# Patient Record
Sex: FEMALE | Race: BLACK OR AFRICAN AMERICAN | NOT HISPANIC OR LATINO | Employment: UNEMPLOYED | URBAN - METROPOLITAN AREA
[De-identification: names, ages, dates, MRNs, and addresses within clinical notes are randomized per-mention and may not be internally consistent; named-entity substitution may affect disease eponyms.]

---
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
## 2024-01-02 ENCOUNTER — ROUTINE PRENATAL (OUTPATIENT)
Dept: OBSTETRICS AND GYNECOLOGY | Facility: CLINIC | Age: 19
End: 2024-01-02
Payer: COMMERCIAL

## 2024-01-02 VITALS — WEIGHT: 210 LBS | BODY MASS INDEX: 39.68 KG/M2 | DIASTOLIC BLOOD PRESSURE: 60 MMHG | SYSTOLIC BLOOD PRESSURE: 112 MMHG

## 2024-01-02 DIAGNOSIS — R73.09 ELEVATED GLUCOSE: ICD-10-CM

## 2024-01-02 DIAGNOSIS — Z34.03 ENCOUNTER FOR PRENATAL CARE IN THIRD TRIMESTER OF FIRST PREGNANCY (HHS-HCC): Primary | ICD-10-CM

## 2024-01-02 DIAGNOSIS — Z3A.35 35 WEEKS GESTATION OF PREGNANCY (HHS-HCC): ICD-10-CM

## 2024-01-02 DIAGNOSIS — O40.3XX1 POLYHYDRAMNIOS IN THIRD TRIMESTER COMPLICATION, FETUS 1 OF MULTIPLE GESTATION (HHS-HCC): ICD-10-CM

## 2024-01-02 PROCEDURE — 0501F PRENATAL FLOW SHEET: CPT | Performed by: ADVANCED PRACTICE MIDWIFE

## 2024-01-02 PROCEDURE — 59025 FETAL NON-STRESS TEST: CPT | Performed by: ADVANCED PRACTICE MIDWIFE

## 2024-01-03 ENCOUNTER — LAB (OUTPATIENT)
Dept: LAB | Facility: LAB | Age: 19
End: 2024-01-03
Payer: COMMERCIAL

## 2024-01-03 DIAGNOSIS — R73.09 ELEVATED GLUCOSE: ICD-10-CM

## 2024-01-03 PROCEDURE — 85027 COMPLETE CBC AUTOMATED: CPT

## 2024-01-03 PROCEDURE — 82728 ASSAY OF FERRITIN: CPT

## 2024-01-03 PROCEDURE — 82746 ASSAY OF FOLIC ACID SERUM: CPT

## 2024-01-03 PROCEDURE — 82950 GLUCOSE TEST: CPT

## 2024-01-03 PROCEDURE — 82607 VITAMIN B-12: CPT

## 2024-01-03 PROCEDURE — 36415 COLL VENOUS BLD VENIPUNCTURE: CPT

## 2024-01-03 PROCEDURE — 82952 GTT-ADDED SAMPLES: CPT

## 2024-01-03 PROCEDURE — 82947 ASSAY GLUCOSE BLOOD QUANT: CPT

## 2024-01-03 PROCEDURE — 83550 IRON BINDING TEST: CPT

## 2024-01-03 PROCEDURE — 82951 GLUCOSE TOLERANCE TEST (GTT): CPT

## 2024-01-04 LAB
ERYTHROCYTE [DISTWIDTH] IN BLOOD BY AUTOMATED COUNT: 17.1 % (ref 11.5–14.5)
FERRITIN SERPL-MCNC: 26 NG/ML
FOLATE SERPL-MCNC: 9.9 NG/ML
GLUCOSE 1H P 100 G GLC PO SERPL-MCNC: 121 MG/DL
GLUCOSE 2H P 100 G GLC PO SERPL-MCNC: 121 MG/DL
GLUCOSE 3H P 100 G GLC PO SERPL-MCNC: 98 MG/DL
GLUCOSE P FAST SERPL-MCNC: 79 MG/DL
HCT VFR BLD AUTO: 30.8 % (ref 36–46)
HGB BLD-MCNC: 8.9 G/DL (ref 12–16)
IRON SATN MFR SERPL: NORMAL %
IRON SERPL-MCNC: 27 UG/DL
MCH RBC QN AUTO: 23.9 PG (ref 26–34)
MCHC RBC AUTO-ENTMCNC: 28.9 G/DL (ref 32–36)
MCV RBC AUTO: 83 FL (ref 80–100)
NRBC BLD-RTO: 0.6 /100 WBCS (ref 0–0)
PLATELET # BLD AUTO: 233 X10*3/UL (ref 150–450)
RBC # BLD AUTO: 3.72 X10*6/UL (ref 4–5.2)
REFLEX ADDED, ANEMIA PANEL: NORMAL
TIBC SERPL-MCNC: NORMAL UG/DL
UIBC SERPL-MCNC: >450 UG/DL
VIT B12 SERPL-MCNC: 224 PG/ML
WBC # BLD AUTO: 10.7 X10*3/UL (ref 4.4–11.3)

## 2024-01-09 ENCOUNTER — ROUTINE PRENATAL (OUTPATIENT)
Dept: OBSTETRICS AND GYNECOLOGY | Facility: CLINIC | Age: 19
End: 2024-01-09
Payer: COMMERCIAL

## 2024-01-09 ENCOUNTER — ANCILLARY PROCEDURE (OUTPATIENT)
Dept: RADIOLOGY | Facility: CLINIC | Age: 19
End: 2024-01-09
Payer: COMMERCIAL

## 2024-01-09 VITALS — SYSTOLIC BLOOD PRESSURE: 114 MMHG | WEIGHT: 212 LBS | DIASTOLIC BLOOD PRESSURE: 60 MMHG | BODY MASS INDEX: 40.06 KG/M2

## 2024-01-09 DIAGNOSIS — O99.013 ANEMIA AFFECTING PREGNANCY IN THIRD TRIMESTER (HHS-HCC): ICD-10-CM

## 2024-01-09 DIAGNOSIS — O36.8131 DECREASED FETAL MOVEMENTS IN THIRD TRIMESTER, FETUS 1 OF MULTIPLE GESTATION (HHS-HCC): Primary | ICD-10-CM

## 2024-01-09 DIAGNOSIS — Z34.03 ENCOUNTER FOR PRENATAL CARE IN THIRD TRIMESTER OF FIRST PREGNANCY (HHS-HCC): ICD-10-CM

## 2024-01-09 DIAGNOSIS — L29.9 ITCHING: ICD-10-CM

## 2024-01-09 DIAGNOSIS — R73.09 ELEVATED GLUCOSE: ICD-10-CM

## 2024-01-09 DIAGNOSIS — O99.891 BACK PAIN IN PREGNANCY (HHS-HCC): ICD-10-CM

## 2024-01-09 DIAGNOSIS — M54.9 BACK PAIN IN PREGNANCY (HHS-HCC): ICD-10-CM

## 2024-01-09 DIAGNOSIS — O09.899 HIGH RISK TEEN PREGNANCY, ANTEPARTUM (HHS-HCC): ICD-10-CM

## 2024-01-09 DIAGNOSIS — O26.849 FETAL SIZE INCONSISTENT WITH DATES (HHS-HCC): ICD-10-CM

## 2024-01-09 PROCEDURE — 87081 CULTURE SCREEN ONLY: CPT

## 2024-01-09 PROCEDURE — 0501F PRENATAL FLOW SHEET: CPT | Performed by: OBSTETRICS & GYNECOLOGY

## 2024-01-09 PROCEDURE — 76816 OB US FOLLOW-UP PER FETUS: CPT | Performed by: OBSTETRICS & GYNECOLOGY

## 2024-01-09 PROCEDURE — 76816 OB US FOLLOW-UP PER FETUS: CPT

## 2024-01-09 PROCEDURE — 59025 FETAL NON-STRESS TEST: CPT | Performed by: OBSTETRICS & GYNECOLOGY

## 2024-01-09 PROCEDURE — 76819 FETAL BIOPHYS PROFIL W/O NST: CPT | Performed by: OBSTETRICS & GYNECOLOGY

## 2024-01-09 NOTE — PROGRESS NOTES
"Patient initially reported decreased fetal movement.  She had an 8/8 BPP with normal JAGDEEP today/this morning.  She has been on the NST and now reports good fetal movement.  She is without symptoms of PTL or PEC.  Although she does report some \"itchy feet \".    She missed her iron infusion appointment last Friday she plans to go this Friday.    NST - reactive/category 1    A/P: HROB - Teen, anemia, obesity, decreased FM, elevated 1 hour GCT/normal GTT    -  NST     -  FM d/w the patient     -  LFTs and bile acids    -  GBS cx sent     -  RTC Q week     -  Iron infusion scheduled     -  Weekly NSTs for BMI>40  "

## 2024-01-12 ENCOUNTER — TELEPHONE (OUTPATIENT)
Dept: OBSTETRICS AND GYNECOLOGY | Facility: CLINIC | Age: 19
End: 2024-01-12
Payer: COMMERCIAL

## 2024-01-12 LAB — GP B STREP GENITAL QL CULT: NORMAL

## 2024-01-12 NOTE — TELEPHONE ENCOUNTER
Name/ verified  Pt called stating that when she woke up this morning she wasn't sure if her water broke. Denies current gush or constant trickle of fluid. Denies pain. +FM noted. Pt states she wasn't sure if it was urine. Reviewed sign of labor and encouraged to use panty liner and continue to monitor fluid. If she has to change pad and or any pelvic pain increases in duration and frequency-advised pt to L&D for rule out ROM.  Pt verbalized understanding.

## 2024-01-12 NOTE — TELEPHONE ENCOUNTER
Name/ verified  Called pt to follow up on recent My Chart message.  Pt is 36.5wks. When pt woke up this morning to use the bathroom and get ready she noticed a lot of discharge so she put a pad/panty liner on. After like 5 mins she noticed it started to feel like she was peeing on herself a little. She said then it stopped and then noticed a little trickle again. On the panty liner it looked yellow, and she wasn't sure if it was urine or not.  At this time she does not notice any leakage of fluid. Reviewed to go to L&D if she continues to notice leakage of fluid on michael pad or continuous trickle or gush of fluid. Along with any abdominal cramping or pain.  Pt verbalized understanding.

## 2024-01-16 ENCOUNTER — ROUTINE PRENATAL (OUTPATIENT)
Dept: OBSTETRICS AND GYNECOLOGY | Facility: CLINIC | Age: 19
End: 2024-01-16
Payer: COMMERCIAL

## 2024-01-16 ENCOUNTER — PREP FOR PROCEDURE (OUTPATIENT)
Dept: OBSTETRICS AND GYNECOLOGY | Facility: CLINIC | Age: 19
End: 2024-01-16

## 2024-01-16 ENCOUNTER — APPOINTMENT (OUTPATIENT)
Dept: PRIMARY CARE | Facility: CLINIC | Age: 19
End: 2024-01-16
Payer: COMMERCIAL

## 2024-01-16 VITALS — DIASTOLIC BLOOD PRESSURE: 70 MMHG | SYSTOLIC BLOOD PRESSURE: 110 MMHG | BODY MASS INDEX: 40.25 KG/M2 | WEIGHT: 213 LBS

## 2024-01-16 DIAGNOSIS — O99.213 OBESITY AFFECTING PREGNANCY IN THIRD TRIMESTER, UNSPECIFIED OBESITY TYPE (HHS-HCC): ICD-10-CM

## 2024-01-16 DIAGNOSIS — O99.891 BACK PAIN IN PREGNANCY (HHS-HCC): ICD-10-CM

## 2024-01-16 DIAGNOSIS — O99.013 ANEMIA AFFECTING PREGNANCY IN THIRD TRIMESTER (HHS-HCC): ICD-10-CM

## 2024-01-16 DIAGNOSIS — M54.9 BACK PAIN IN PREGNANCY (HHS-HCC): ICD-10-CM

## 2024-01-16 DIAGNOSIS — O09.899 HIGH RISK TEEN PREGNANCY, ANTEPARTUM (HHS-HCC): Primary | ICD-10-CM

## 2024-01-16 PROCEDURE — 0501F PRENATAL FLOW SHEET: CPT | Performed by: OBSTETRICS & GYNECOLOGY

## 2024-01-16 NOTE — PROGRESS NOTES
The patient reports good fetal movement.  She is without symptoms of active labor or preeclampsia, although she would like her cervix checked today.    She states the itching on her feet has dramatically decreased.    O: -/-3/soft/posterior       GBS negative        AC=92%ile    A/P: HROB - Teen, morbid obesity, anemia     -  D/W the patient 39 week IOL     -  Fetal movement     -  Pt still needs to identify a Pediatrician     -  Iron infusions

## 2024-01-16 NOTE — PROGRESS NOTES
Dr. Man request pt to have IOL at 39wks gest. Sarthak Robins -pt to have IOL on January 30 at 11am. IOL instructions provided to pt and verbally discussed. Pt verbalized understanding. Pt placed on EFM for scheduled NST.

## 2024-01-19 ENCOUNTER — APPOINTMENT (OUTPATIENT)
Dept: RHEUMATOLOGY | Facility: CLINIC | Age: 19
End: 2024-01-19
Payer: COMMERCIAL

## 2024-01-22 ENCOUNTER — ANESTHESIA EVENT (OUTPATIENT)
Dept: OBSTETRICS AND GYNECOLOGY | Facility: HOSPITAL | Age: 19
End: 2024-01-22
Payer: COMMERCIAL

## 2024-01-22 ENCOUNTER — APPOINTMENT (OUTPATIENT)
Dept: OBSTETRICS AND GYNECOLOGY | Facility: CLINIC | Age: 19
End: 2024-01-22
Payer: COMMERCIAL

## 2024-01-22 ENCOUNTER — ANESTHESIA (OUTPATIENT)
Dept: OBSTETRICS AND GYNECOLOGY | Facility: HOSPITAL | Age: 19
End: 2024-01-22
Payer: COMMERCIAL

## 2024-01-22 ENCOUNTER — HOSPITAL ENCOUNTER (INPATIENT)
Facility: HOSPITAL | Age: 19
LOS: 2 days | Discharge: HOME | End: 2024-01-24
Attending: OBSTETRICS & GYNECOLOGY | Admitting: OBSTETRICS & GYNECOLOGY
Payer: COMMERCIAL

## 2024-01-22 DIAGNOSIS — E78.49 OTHER HYPERLIPIDEMIA: ICD-10-CM

## 2024-01-22 LAB
ABO GROUP (TYPE) IN BLOOD: NORMAL
ANTIBODY SCREEN: NORMAL
BB ANTIBODY IDENTIFICATION: NORMAL
CASE #: NORMAL
ERYTHROCYTE [DISTWIDTH] IN BLOOD BY AUTOMATED COUNT: 17.2 % (ref 11.5–14.5)
HCT VFR BLD AUTO: 32.8 % (ref 36–46)
HGB BLD-MCNC: 10.2 G/DL (ref 12–16)
MCH RBC QN AUTO: 24.2 PG (ref 26–34)
MCHC RBC AUTO-ENTMCNC: 31.1 G/DL (ref 32–36)
MCV RBC AUTO: 78 FL (ref 80–100)
NRBC BLD-RTO: 0.3 /100 WBCS (ref 0–0)
PLATELET # BLD AUTO: 206 X10*3/UL (ref 150–450)
RBC # BLD AUTO: 4.22 X10*6/UL (ref 4–5.2)
RH FACTOR (ANTIGEN D): NORMAL
TREPONEMA PALLIDUM IGG+IGM AB [PRESENCE] IN SERUM OR PLASMA BY IMMUNOASSAY: NONREACTIVE
WBC # BLD AUTO: 13 X10*3/UL (ref 4.4–11.3)

## 2024-01-22 PROCEDURE — 59409 OBSTETRICAL CARE: CPT | Performed by: OBSTETRICS & GYNECOLOGY

## 2024-01-22 PROCEDURE — 51701 INSERT BLADDER CATHETER: CPT

## 2024-01-22 PROCEDURE — 01967 NEURAXL LBR ANES VAG DLVR: CPT | Performed by: ANESTHESIOLOGY

## 2024-01-22 PROCEDURE — 86780 TREPONEMA PALLIDUM: CPT

## 2024-01-22 PROCEDURE — 2500000005 HC RX 250 GENERAL PHARMACY W/O HCPCS

## 2024-01-22 PROCEDURE — 1100000001 HC PRIVATE ROOM DAILY

## 2024-01-22 PROCEDURE — 2500000004 HC RX 250 GENERAL PHARMACY W/ HCPCS (ALT 636 FOR OP/ED)

## 2024-01-22 PROCEDURE — 86922 COMPATIBILITY TEST ANTIGLOB: CPT

## 2024-01-22 PROCEDURE — 0UQMXZZ REPAIR VULVA, EXTERNAL APPROACH: ICD-10-PCS | Performed by: OBSTETRICS & GYNECOLOGY

## 2024-01-22 PROCEDURE — 86077 PHYS BLOOD BANK SERV XMATCH: CPT

## 2024-01-22 PROCEDURE — 85027 COMPLETE CBC AUTOMATED: CPT

## 2024-01-22 PROCEDURE — 86850 RBC ANTIBODY SCREEN: CPT

## 2024-01-22 PROCEDURE — 36415 COLL VENOUS BLD VENIPUNCTURE: CPT

## 2024-01-22 PROCEDURE — 86901 BLOOD TYPING SEROLOGIC RH(D): CPT

## 2024-01-22 PROCEDURE — 86870 RBC ANTIBODY IDENTIFICATION: CPT

## 2024-01-22 PROCEDURE — 59400 OBSTETRICAL CARE: CPT

## 2024-01-22 PROCEDURE — 10907ZC DRAINAGE OF AMNIOTIC FLUID, THERAPEUTIC FROM PRODUCTS OF CONCEPTION, VIA NATURAL OR ARTIFICIAL OPENING: ICD-10-PCS | Performed by: OBSTETRICS & GYNECOLOGY

## 2024-01-22 PROCEDURE — 2500000001 HC RX 250 WO HCPCS SELF ADMINISTERED DRUGS (ALT 637 FOR MEDICARE OP)

## 2024-01-22 RX ORDER — METOCLOPRAMIDE 10 MG/1
10 TABLET ORAL EVERY 6 HOURS PRN
Status: DISCONTINUED | OUTPATIENT
Start: 2024-01-22 | End: 2024-01-23

## 2024-01-22 RX ORDER — LABETALOL HYDROCHLORIDE 5 MG/ML
20 INJECTION, SOLUTION INTRAVENOUS ONCE AS NEEDED
Status: DISCONTINUED | OUTPATIENT
Start: 2024-01-22 | End: 2024-01-23

## 2024-01-22 RX ORDER — IBUPROFEN 600 MG/1
600 TABLET ORAL EVERY 6 HOURS
Status: DISCONTINUED | OUTPATIENT
Start: 2024-01-22 | End: 2024-01-24 | Stop reason: HOSPADM

## 2024-01-22 RX ORDER — CARBOPROST TROMETHAMINE 250 UG/ML
250 INJECTION, SOLUTION INTRAMUSCULAR ONCE AS NEEDED
Status: DISCONTINUED | OUTPATIENT
Start: 2024-01-22 | End: 2024-01-23

## 2024-01-22 RX ORDER — HYDRALAZINE HYDROCHLORIDE 20 MG/ML
5 INJECTION INTRAMUSCULAR; INTRAVENOUS ONCE AS NEEDED
Status: DISCONTINUED | OUTPATIENT
Start: 2024-01-22 | End: 2024-01-23

## 2024-01-22 RX ORDER — LIDOCAINE HYDROCHLORIDE 10 MG/ML
30 INJECTION INFILTRATION; PERINEURAL ONCE AS NEEDED
Status: DISCONTINUED | OUTPATIENT
Start: 2024-01-22 | End: 2024-01-23

## 2024-01-22 RX ORDER — SODIUM CHLORIDE, SODIUM LACTATE, POTASSIUM CHLORIDE, CALCIUM CHLORIDE 600; 310; 30; 20 MG/100ML; MG/100ML; MG/100ML; MG/100ML
125 INJECTION, SOLUTION INTRAVENOUS CONTINUOUS
Status: DISCONTINUED | OUTPATIENT
Start: 2024-01-22 | End: 2024-01-23

## 2024-01-22 RX ORDER — TRANEXAMIC ACID 100 MG/ML
1000 INJECTION, SOLUTION INTRAVENOUS ONCE AS NEEDED
Status: DISCONTINUED | OUTPATIENT
Start: 2024-01-22 | End: 2024-01-23

## 2024-01-22 RX ORDER — NIFEDIPINE 10 MG/1
10 CAPSULE ORAL ONCE AS NEEDED
Status: DISCONTINUED | OUTPATIENT
Start: 2024-01-22 | End: 2024-01-23

## 2024-01-22 RX ORDER — MISOPROSTOL 200 UG/1
800 TABLET ORAL ONCE AS NEEDED
Status: DISCONTINUED | OUTPATIENT
Start: 2024-01-22 | End: 2024-01-23

## 2024-01-22 RX ORDER — TERBUTALINE SULFATE 1 MG/ML
0.25 INJECTION SUBCUTANEOUS ONCE AS NEEDED
Status: DISCONTINUED | OUTPATIENT
Start: 2024-01-22 | End: 2024-01-23

## 2024-01-22 RX ORDER — ONDANSETRON HYDROCHLORIDE 2 MG/ML
4 INJECTION, SOLUTION INTRAVENOUS EVERY 6 HOURS PRN
Status: DISCONTINUED | OUTPATIENT
Start: 2024-01-22 | End: 2024-01-23

## 2024-01-22 RX ORDER — METOCLOPRAMIDE HYDROCHLORIDE 5 MG/ML
10 INJECTION INTRAMUSCULAR; INTRAVENOUS EVERY 6 HOURS PRN
Status: DISCONTINUED | OUTPATIENT
Start: 2024-01-22 | End: 2024-01-23

## 2024-01-22 RX ORDER — ONDANSETRON 4 MG/1
4 TABLET, FILM COATED ORAL EVERY 6 HOURS PRN
Status: DISCONTINUED | OUTPATIENT
Start: 2024-01-22 | End: 2024-01-23

## 2024-01-22 RX ORDER — OXYTOCIN 10 [USP'U]/ML
10 INJECTION, SOLUTION INTRAMUSCULAR; INTRAVENOUS ONCE AS NEEDED
Status: DISCONTINUED | OUTPATIENT
Start: 2024-01-22 | End: 2024-01-23

## 2024-01-22 RX ORDER — LOPERAMIDE HYDROCHLORIDE 2 MG/1
4 CAPSULE ORAL EVERY 2 HOUR PRN
Status: DISCONTINUED | OUTPATIENT
Start: 2024-01-22 | End: 2024-01-23

## 2024-01-22 RX ORDER — ACETAMINOPHEN 325 MG/1
975 TABLET ORAL EVERY 6 HOURS
Status: DISCONTINUED | OUTPATIENT
Start: 2024-01-22 | End: 2024-01-24 | Stop reason: HOSPADM

## 2024-01-22 RX ORDER — METHYLERGONOVINE MALEATE 0.2 MG/ML
0.2 INJECTION INTRAVENOUS ONCE AS NEEDED
Status: DISCONTINUED | OUTPATIENT
Start: 2024-01-22 | End: 2024-01-23

## 2024-01-22 RX ORDER — FENTANYL/BUPIVACAINE/NS/PF 2MCG/ML-.1
PLASTIC BAG, INJECTION (ML) INJECTION CONTINUOUS PRN
Status: DISCONTINUED | OUTPATIENT
Start: 2024-01-22 | End: 2024-01-22

## 2024-01-22 RX ORDER — FENTANYL/BUPIVACAINE/NS/PF 2MCG/ML-.1
PLASTIC BAG, INJECTION (ML) INJECTION AS NEEDED
Status: DISCONTINUED | OUTPATIENT
Start: 2024-01-22 | End: 2024-01-22

## 2024-01-22 RX ORDER — OXYTOCIN/0.9 % SODIUM CHLORIDE 30/500 ML
60 PLASTIC BAG, INJECTION (ML) INTRAVENOUS ONCE AS NEEDED
Status: COMPLETED | OUTPATIENT
Start: 2024-01-22 | End: 2024-01-22

## 2024-01-22 RX ADMIN — Medication 5 ML: at 09:45

## 2024-01-22 RX ADMIN — IBUPROFEN 600 MG: 600 TABLET, FILM COATED ORAL at 23:48

## 2024-01-22 RX ADMIN — SODIUM CHLORIDE, POTASSIUM CHLORIDE, SODIUM LACTATE AND CALCIUM CHLORIDE 125 ML/HR: 600; 310; 30; 20 INJECTION, SOLUTION INTRAVENOUS at 08:15

## 2024-01-22 RX ADMIN — ACETAMINOPHEN 975 MG: 325 TABLET ORAL at 23:49

## 2024-01-22 RX ADMIN — Medication 14 ML/HR: at 19:47

## 2024-01-22 RX ADMIN — Medication 14 ML/HR: at 09:50

## 2024-01-22 RX ADMIN — Medication 5 ML: at 09:41

## 2024-01-22 RX ADMIN — Medication 60 MILLI-UNITS/MIN: at 21:49

## 2024-01-22 RX ADMIN — SODIUM CHLORIDE, POTASSIUM CHLORIDE, SODIUM LACTATE AND CALCIUM CHLORIDE 500 ML: 600; 310; 30; 20 INJECTION, SOLUTION INTRAVENOUS at 21:06

## 2024-01-22 SDOH — SOCIAL STABILITY: SOCIAL INSECURITY: PHYSICAL ABUSE: DENIES

## 2024-01-22 SDOH — HEALTH STABILITY: MENTAL HEALTH: CURRENT SMOKER: 0

## 2024-01-22 SDOH — SOCIAL STABILITY: SOCIAL INSECURITY: ARE THERE ANY APPARENT SIGNS OF INJURIES/BEHAVIORS THAT COULD BE RELATED TO ABUSE/NEGLECT?: NO

## 2024-01-22 SDOH — SOCIAL STABILITY: SOCIAL INSECURITY: ARE YOU OR HAVE YOU BEEN THREATENED OR ABUSED PHYSICALLY, EMOTIONALLY, OR SEXUALLY BY ANYONE?: NO

## 2024-01-22 SDOH — SOCIAL STABILITY: SOCIAL INSECURITY: ABUSE SCREEN: ADULT

## 2024-01-22 SDOH — HEALTH STABILITY: MENTAL HEALTH: WERE YOU ABLE TO COMPLETE ALL THE BEHAVIORAL HEALTH SCREENINGS?: YES

## 2024-01-22 SDOH — SOCIAL STABILITY: SOCIAL INSECURITY: DO YOU FEEL ANYONE HAS EXPLOITED OR TAKEN ADVANTAGE OF YOU FINANCIALLY OR OF YOUR PERSONAL PROPERTY?: NO

## 2024-01-22 SDOH — ECONOMIC STABILITY: HOUSING INSECURITY: DO YOU FEEL UNSAFE GOING BACK TO THE PLACE WHERE YOU ARE LIVING?: NO

## 2024-01-22 SDOH — HEALTH STABILITY: MENTAL HEALTH: SUICIDAL BEHAVIOR (LIFETIME): NO

## 2024-01-22 SDOH — SOCIAL STABILITY: SOCIAL INSECURITY: DOES ANYONE TRY TO KEEP YOU FROM HAVING/CONTACTING OTHER FRIENDS OR DOING THINGS OUTSIDE YOUR HOME?: NO

## 2024-01-22 SDOH — SOCIAL STABILITY: SOCIAL INSECURITY: HAS ANYONE EVER THREATENED TO HURT YOUR FAMILY OR YOUR PETS?: NO

## 2024-01-22 SDOH — SOCIAL STABILITY: SOCIAL INSECURITY: VERBAL ABUSE: DENIES

## 2024-01-22 SDOH — SOCIAL STABILITY: SOCIAL INSECURITY: HAVE YOU HAD THOUGHTS OF HARMING ANYONE ELSE?: NO

## 2024-01-22 SDOH — HEALTH STABILITY: MENTAL HEALTH: WISH TO BE DEAD (PAST 1 MONTH): NO

## 2024-01-22 SDOH — HEALTH STABILITY: MENTAL HEALTH: NON-SPECIFIC ACTIVE SUICIDAL THOUGHTS (PAST 1 MONTH): NO

## 2024-01-22 ASSESSMENT — PAIN SCALES - GENERAL
PAINLEVEL_OUTOF10: 0 - NO PAIN
PAINLEVEL_OUTOF10: 4
PAINLEVEL_OUTOF10: 0 - NO PAIN
PAINLEVEL_OUTOF10: 4
PAINLEVEL_OUTOF10: 0 - NO PAIN

## 2024-01-22 ASSESSMENT — LIFESTYLE VARIABLES
HOW MANY STANDARD DRINKS CONTAINING ALCOHOL DO YOU HAVE ON A TYPICAL DAY: PATIENT DOES NOT DRINK
AUDIT-C TOTAL SCORE: 0
HOW OFTEN DO YOU HAVE A DRINK CONTAINING ALCOHOL: NEVER
SKIP TO QUESTIONS 9-10: 1
AUDIT-C TOTAL SCORE: 0
HOW OFTEN DO YOU HAVE 6 OR MORE DRINKS ON ONE OCCASION: NEVER

## 2024-01-22 ASSESSMENT — PATIENT HEALTH QUESTIONNAIRE - PHQ9
SUM OF ALL RESPONSES TO PHQ9 QUESTIONS 1 & 2: 0
1. LITTLE INTEREST OR PLEASURE IN DOING THINGS: NOT AT ALL
2. FEELING DOWN, DEPRESSED OR HOPELESS: NOT AT ALL

## 2024-01-22 ASSESSMENT — ACTIVITIES OF DAILY LIVING (ADL): LACK_OF_TRANSPORTATION: NO

## 2024-01-22 NOTE — INDIVIDUALIZED OVERALL PLAN OF CARE NOTE
Cervical Exam  Dilation: 5  Effacement (%): 100  Fetal Station: -2  OB Examiner: DELON Tan  Fetal Assessment  Movement: Present  Mode: External US  Baseline Fetal Heart Rate (bpm): 140 bpm  Baseline Classification: Normal  Variability: Moderate (Between 6 and 25 BPM)  Pattern: Accelerations, Early decelerations, Late decelerations      Contraction Frequency: 2-3.5    Lea Tan MD PGY1

## 2024-01-22 NOTE — ANESTHESIA PREPROCEDURE EVALUATION
Patient: Jaiden Skinner    Evaluation Method: In-person visit    Procedure Information    Date: 01/22/24  Procedure: Labor Analgesia         Relevant Problems   Pulmonary   (+) Asthma   (+) Asthma affecting pregnancy, antepartum      Hematology   (+) Anemia affecting pregnancy      Infectious Disease   (+) Yeast vaginitis       Clinical information reviewed:   Tobacco  Allergies  Meds   Med Hx  Surg Hx   Fam Hx          NPO Detail:  No data recorded     OB/GYN     Physical Exam    Airway  Mallampati: I  TM distance: >3 FB  Neck ROM: full     Cardiovascular - normal exam  Rhythm: regular  Rate: normal     Dental - normal exam     Pulmonary - normal exam  Breath sounds clear to auscultation     Abdominal          Anesthesia Plan    History of general anesthesia?: no  History of complications of general anesthesia?: unknown/emergency    ASA 3     epidural     The patient is not a current smoker.  Patient did not smoke on day of procedure.    Anesthetic plan and risks discussed with patient.    Plan discussed with attending, resident and CRNA.

## 2024-01-22 NOTE — ED TRIAGE NOTES
Pt states that she is 38 weeks pregnant, she is due on 24. Pt states that she is having contractions but she is not sure how far apart they are. Pt denies her water breaking or any loss of fluids. Pt is a .

## 2024-01-22 NOTE — H&P
Obstetrical Admission History and Physical    Jaiden Skinner is a 18 y.o.  at 38w1d. DAO: 2024, by 7w2d Ultrasound admitted for labor.  She has had prenatal care with Dr Man.    Pregnancy n/f  - Anemia  - Teen pregnancy  - Asthma  - Rh negative  - AbN 1 hr, N 3 hr    Chief Complaint: Contractions    Assessment/Plan      Principal Problem:    Indication for care or intervention in labor or delivery  Active Problems:    Asthma    Anemia affecting pregnancy    High risk teen pregnancy, antepartum        Pregnancy Problems (from 23 to present)       Problem Noted Resolved    Indication for care or intervention in labor or delivery 2024 by Lea Tan MD No    Priority:  Medium                  Rh negative state in antepartum period 2023 by Anna Man MD No    Priority:  Medium      Overview Signed 2024  9:05 AM by Lea Tan MD     Rho Cleve on 2023                     Anemia affecting pregnancy 10/13/2023 by Meka Palacios No    Priority:  Medium      Overview Addendum 2024  9:03 AM by Lea Tan MD     Last Hb: 8.9 1/3/2024  Pt was scheduled for IV iron infusion  Type and crossed for 2 units of PRBCs         Asthma affecting pregnancy, antepartum 10/13/2023 by Meka Palacios No    Priority:  Medium      High risk teen pregnancy, antepartum 10/13/2023 by Meka Palacios No    Priority:  Medium      Overview Addendum 2024  9:07 AM by Lea Tan MD     Abnormal 1 hour, normal 3 hr GTT  Teen pregnancy  Anemia  GBS negative                 Options for delivery have been discussed with the patient and she elects a vaginal delivery.  Labor has been discussed in detail. The risks, benefits, complications, alternatives, expected outcomes, potential problems during recuperation and recovery, and the risks of not performing the procedure were discussed with the patient. The patient stated understanding that the risks of delivery include, but are not limited to: death;  reaction to medications; injury to bowel, bladder, ureters, uterus, cervix, vagina, and other pelvic and abdominal structures, infection; blood loss and possible need for transfusion; and potential need for surgery, including hysterectomy. The risks of injury to the infant during delivery were also discussed. All questions were answered. There was concurrence with the planned procedure, and the patient wanted to proceed.    Admit to inpatient status. I anticipate that this patient will require a stay exceeding at least 2 midnights for delivery and postpartum care.  Active management of labor.  Management of pregnancy complications, as indicated.    Seen and d/w Dr. Ginette Tan MD PGY1    Subjective   Jaiden is here complaining of q3 min contractions. Good fetal movement. Denies vaginal bleeding., Denies leaking of fluid.            Obstetrical History   OB History    Para Term  AB Living   1 0 0 0 0 0   SAB IAB Ectopic Multiple Live Births   0 0 0 0 0      # Outcome Date GA Lbr Anthony/2nd Weight Sex Delivery Anes PTL Lv   1 Current                Past Medical History  Past Medical History:   Diagnosis Date    Anemia     Asthma affecting pregnancy, antepartum     High risk teen pregnancy, antepartum     History of pica     Nausea and vomiting in pregnancy     Rh negative status during pregnancy     Rhogam at 28 weeks        Past Surgical History   History reviewed. No pertinent surgical history.    Social History  Social History     Tobacco Use    Smoking status: Never     Passive exposure: Never    Smokeless tobacco: Never   Substance Use Topics    Alcohol use: Never     Substance and Sexual Activity   Drug Use Never       Allergies  Patient has no known allergies.     Medications  Medications Prior to Admission   Medication Sig Dispense Refill Last Dose    albuterol 90 mcg/actuation inhaler Inhale 2 puffs every 4 hours if needed for other.       prenatal vitamin calcium-iron-folic (Prenatal  Vitamin Plus Low Iron) 27 mg iron- 1 mg tablet Take 1 tablet by mouth once daily. 90 tablet 1     prenatal vitamin, iron-folic, 27 mg iron-800 mcg folic acid tablet Take 1 tablet by mouth once daily.          Objective    Last Vitals  Temp Pulse Resp BP MAP O2 Sat   36.8 °C (98.2 °F) 102 18 125/82   100 %     Physical Examination  General: no acute distress  HEENT: normocephalic, atraumatic  Heart: warm and well perfused, RRR  Lungs: no increased WOB, CTAB  Abd: soft, nontender, gravid  Extremities: moving all extremities  Neuro: awake and conversant  Psych: appropriate mood and affect   FHT: [id number]/moderate varaibility/+accel/-decel  [place]:   Ctx q3 m

## 2024-01-22 NOTE — INDIVIDUALIZED OVERALL PLAN OF CARE NOTE
House staff to bedside for AROM  Cervical Exam  Dilation: 4  Effacement (%): 100  Fetal Station: -2  OB Examiner: DELON Tan  Fetal Assessment  Movement: Present  Mode: External US  Baseline Fetal Heart Rate (bpm): 145 bpm  Baseline Classification: Normal  Variability: Moderate (Between 6 and 25 BPM)  Pattern: Accelerations      Contraction Frequency: 1.5-3  AROMed for clear    Lea Tan MD PGY1

## 2024-01-22 NOTE — PROGRESS NOTES
Intrapartum Progress Note    Assessment/Plan   Jaiden Skinner is a 18 y.o.  at 38w1d by 7wk US admitted for labor    Labor   - S/p AROM at 1015  - hgb 10.2. T&C x2 units pRBCs  - epidural/IV pain meds per pt request  - PPBC: depo    Asthma  - Albuterol PRN  - Avoid Hemabate    Fetal wellbeing  - CEFM, currently Cat 1  - GBS neg. No indication For PCN ppx     Seen & Discussed with Dr. Cecilio Monsivais MD, PGY-1  I saw and evaluated the patient. I personally obtained the key and critical portions of the history and physical exam or was physically present for key and critical portions performed by the resident/fellow. I reviewed the resident/fellow's documentation and discussed the patient with the resident/fellow. I agree with the resident/fellow's medical decision making as documented in the note.    Rebeka Hernandes MD     Subjective   Feeling comfortable on epidural. Not feeling increased pressure    Objective   Last Vitals:  Temp Pulse Resp BP MAP Pulse Ox   36.8 °C (98.2 °F) 99 16 123/80   99 %     Vitals Min/Max Last 24 Hours:  Temp  Min: 36.4 °C (97.5 °F)  Max: 37.1 °C (98.8 °F)  Pulse  Min: 93  Max: 117  Resp  Min: 16  Max: 18  BP  Min: 118/75  Max: 141/82    Intake/Output:    Intake/Output Summary (Last 24 hours) at 2024 1749  Last data filed at 2024 1536  Gross per 24 hour   Intake --   Output 500 ml   Net -500 ml       Physical Examination:  GENERAL: Examination reveals a well developed, well nourished, gravid female in no acute distress. She is alert and cooperative.  LUNGS: No increased work of breathing on room air  HEART: Regular rate  EXTREMITIES: Full ROM  NEUROLOGICAL: alert, oriented, normal speech, no focal findings or movement disorder noted    Cervical Exam  Dilation: 5  Effacement (%): 100  Fetal Station: -2  OB Examiner: DELON Tan  Fetal Assessment  Movement: Present  Mode: External US  Baseline Fetal Heart Rate (bpm): 150 bpm  Baseline Classification:  Normal  Variability: Moderate (Between 6 and 25 BPM)  Pattern: Early decelerations      Contraction Frequency: 2-3      Lab Review:  Lab Results   Component Value Date    WBC 13.0 (H) 01/22/2024    HGB 10.2 (L) 01/22/2024    HCT 32.8 (L) 01/22/2024     01/22/2024

## 2024-01-23 LAB
FETAL MATERNAL SCREEN: NORMAL
PATH REV-IMMUNOHEMATOLOGY-PR30: NORMAL

## 2024-01-23 PROCEDURE — 88307 TISSUE EXAM BY PATHOLOGIST: CPT | Performed by: PATHOLOGY

## 2024-01-23 PROCEDURE — 59050 FETAL MONITOR W/REPORT: CPT

## 2024-01-23 PROCEDURE — 3E0234Z INTRODUCTION OF SERUM, TOXOID AND VACCINE INTO MUSCLE, PERCUTANEOUS APPROACH: ICD-10-PCS | Performed by: NURSE PRACTITIONER

## 2024-01-23 PROCEDURE — 2500000001 HC RX 250 WO HCPCS SELF ADMINISTERED DRUGS (ALT 637 FOR MEDICARE OP)

## 2024-01-23 PROCEDURE — 51701 INSERT BLADDER CATHETER: CPT

## 2024-01-23 PROCEDURE — 7210000002 HC LABOR PER HOUR

## 2024-01-23 PROCEDURE — 2500000005 HC RX 250 GENERAL PHARMACY W/O HCPCS

## 2024-01-23 PROCEDURE — 2500000004 HC RX 250 GENERAL PHARMACY W/ HCPCS (ALT 636 FOR OP/ED)

## 2024-01-23 PROCEDURE — 85461 HEMOGLOBIN FETAL: CPT

## 2024-01-23 PROCEDURE — 7100000016 HC LABOR RECOVERY PER HOUR

## 2024-01-23 PROCEDURE — 88307 TISSUE EXAM BY PATHOLOGIST: CPT | Mod: TC,SUR

## 2024-01-23 PROCEDURE — 1100000001 HC PRIVATE ROOM DAILY

## 2024-01-23 RX ORDER — LIDOCAINE 560 MG/1
1 PATCH PERCUTANEOUS; TOPICAL; TRANSDERMAL
Status: DISCONTINUED | OUTPATIENT
Start: 2024-01-23 | End: 2024-01-24 | Stop reason: HOSPADM

## 2024-01-23 RX ORDER — DIPHENHYDRAMINE HCL 25 MG
25 CAPSULE ORAL EVERY 6 HOURS PRN
Status: DISCONTINUED | OUTPATIENT
Start: 2024-01-23 | End: 2024-01-24 | Stop reason: HOSPADM

## 2024-01-23 RX ORDER — METHYLERGONOVINE MALEATE 0.2 MG/ML
0.2 INJECTION INTRAVENOUS ONCE AS NEEDED
Status: DISCONTINUED | OUTPATIENT
Start: 2024-01-23 | End: 2024-01-24 | Stop reason: HOSPADM

## 2024-01-23 RX ORDER — ONDANSETRON 4 MG/1
4 TABLET, FILM COATED ORAL EVERY 6 HOURS PRN
Status: DISCONTINUED | OUTPATIENT
Start: 2024-01-23 | End: 2024-01-24 | Stop reason: HOSPADM

## 2024-01-23 RX ORDER — NIFEDIPINE 10 MG/1
10 CAPSULE ORAL ONCE AS NEEDED
Status: DISCONTINUED | OUTPATIENT
Start: 2024-01-23 | End: 2024-01-24 | Stop reason: HOSPADM

## 2024-01-23 RX ORDER — BISACODYL 10 MG/1
10 SUPPOSITORY RECTAL DAILY PRN
Status: DISCONTINUED | OUTPATIENT
Start: 2024-01-23 | End: 2024-01-24 | Stop reason: HOSPADM

## 2024-01-23 RX ORDER — POLYETHYLENE GLYCOL 3350 17 G/17G
17 POWDER, FOR SOLUTION ORAL 2 TIMES DAILY PRN
Status: DISCONTINUED | OUTPATIENT
Start: 2024-01-23 | End: 2024-01-24 | Stop reason: HOSPADM

## 2024-01-23 RX ORDER — MISOPROSTOL 200 UG/1
800 TABLET ORAL ONCE AS NEEDED
Status: DISCONTINUED | OUTPATIENT
Start: 2024-01-23 | End: 2024-01-24 | Stop reason: HOSPADM

## 2024-01-23 RX ORDER — LOPERAMIDE HYDROCHLORIDE 2 MG/1
4 CAPSULE ORAL EVERY 2 HOUR PRN
Status: DISCONTINUED | OUTPATIENT
Start: 2024-01-23 | End: 2024-01-24 | Stop reason: HOSPADM

## 2024-01-23 RX ORDER — ONDANSETRON HYDROCHLORIDE 2 MG/ML
4 INJECTION, SOLUTION INTRAVENOUS EVERY 6 HOURS PRN
Status: DISCONTINUED | OUTPATIENT
Start: 2024-01-23 | End: 2024-01-24 | Stop reason: HOSPADM

## 2024-01-23 RX ORDER — TRANEXAMIC ACID 100 MG/ML
1000 INJECTION, SOLUTION INTRAVENOUS ONCE AS NEEDED
Status: DISCONTINUED | OUTPATIENT
Start: 2024-01-23 | End: 2024-01-24 | Stop reason: HOSPADM

## 2024-01-23 RX ORDER — DIPHENHYDRAMINE HYDROCHLORIDE 50 MG/ML
25 INJECTION INTRAMUSCULAR; INTRAVENOUS EVERY 6 HOURS PRN
Status: DISCONTINUED | OUTPATIENT
Start: 2024-01-23 | End: 2024-01-24 | Stop reason: HOSPADM

## 2024-01-23 RX ORDER — ADHESIVE BANDAGE
30 BANDAGE TOPICAL
Status: DISCONTINUED | OUTPATIENT
Start: 2024-01-23 | End: 2024-01-24 | Stop reason: HOSPADM

## 2024-01-23 RX ORDER — SIMETHICONE 80 MG
80 TABLET,CHEWABLE ORAL 4 TIMES DAILY PRN
Status: DISCONTINUED | OUTPATIENT
Start: 2024-01-23 | End: 2024-01-24 | Stop reason: HOSPADM

## 2024-01-23 RX ORDER — ENOXAPARIN SODIUM 100 MG/ML
60 INJECTION SUBCUTANEOUS EVERY 24 HOURS
Status: DISCONTINUED | OUTPATIENT
Start: 2024-01-23 | End: 2024-01-24 | Stop reason: HOSPADM

## 2024-01-23 RX ORDER — CARBOPROST TROMETHAMINE 250 UG/ML
250 INJECTION, SOLUTION INTRAMUSCULAR ONCE AS NEEDED
Status: DISCONTINUED | OUTPATIENT
Start: 2024-01-23 | End: 2024-01-24 | Stop reason: HOSPADM

## 2024-01-23 RX ORDER — HYDRALAZINE HYDROCHLORIDE 20 MG/ML
5 INJECTION INTRAMUSCULAR; INTRAVENOUS ONCE AS NEEDED
Status: DISCONTINUED | OUTPATIENT
Start: 2024-01-23 | End: 2024-01-24 | Stop reason: HOSPADM

## 2024-01-23 RX ORDER — OXYTOCIN/0.9 % SODIUM CHLORIDE 30/500 ML
60 PLASTIC BAG, INJECTION (ML) INTRAVENOUS ONCE AS NEEDED
Status: DISCONTINUED | OUTPATIENT
Start: 2024-01-23 | End: 2024-01-24 | Stop reason: HOSPADM

## 2024-01-23 RX ORDER — OXYTOCIN 10 [USP'U]/ML
10 INJECTION, SOLUTION INTRAMUSCULAR; INTRAVENOUS ONCE AS NEEDED
Status: DISCONTINUED | OUTPATIENT
Start: 2024-01-23 | End: 2024-01-24 | Stop reason: HOSPADM

## 2024-01-23 RX ORDER — LABETALOL HYDROCHLORIDE 5 MG/ML
20 INJECTION, SOLUTION INTRAVENOUS ONCE AS NEEDED
Status: DISCONTINUED | OUTPATIENT
Start: 2024-01-23 | End: 2024-01-24 | Stop reason: HOSPADM

## 2024-01-23 RX ADMIN — IBUPROFEN 600 MG: 600 TABLET, FILM COATED ORAL at 23:21

## 2024-01-23 RX ADMIN — ENOXAPARIN SODIUM 60 MG: 100 INJECTION SUBCUTANEOUS at 23:21

## 2024-01-23 RX ADMIN — ACETAMINOPHEN 975 MG: 325 TABLET ORAL at 17:43

## 2024-01-23 RX ADMIN — IBUPROFEN 600 MG: 600 TABLET, FILM COATED ORAL at 17:44

## 2024-01-23 RX ADMIN — IBUPROFEN 600 MG: 600 TABLET, FILM COATED ORAL at 05:33

## 2024-01-23 RX ADMIN — BENZOCAINE AND LEVOMENTHOL 1 APPLICATION: 200; 5 SPRAY TOPICAL at 11:42

## 2024-01-23 RX ADMIN — HUMAN RHO(D) IMMUNE GLOBULIN 300 MCG: 300 INJECTION, SOLUTION INTRAMUSCULAR at 08:01

## 2024-01-23 RX ADMIN — Medication 1 EACH: at 11:43

## 2024-01-23 RX ADMIN — ACETAMINOPHEN 975 MG: 325 TABLET ORAL at 11:38

## 2024-01-23 RX ADMIN — ACETAMINOPHEN 975 MG: 325 TABLET ORAL at 23:21

## 2024-01-23 RX ADMIN — ACETAMINOPHEN 975 MG: 325 TABLET ORAL at 05:33

## 2024-01-23 ASSESSMENT — PAIN - FUNCTIONAL ASSESSMENT
PAIN_FUNCTIONAL_ASSESSMENT: 0-10
PAIN_FUNCTIONAL_ASSESSMENT: 0-10

## 2024-01-23 ASSESSMENT — PAIN SCALES - GENERAL
PAINLEVEL_OUTOF10: 1
PAIN_LEVEL: 0
PAINLEVEL_OUTOF10: 0 - NO PAIN
PAINLEVEL_OUTOF10: 2
PAINLEVEL_OUTOF10: 3
PAINLEVEL_OUTOF10: 3
PAINLEVEL_OUTOF10: 0 - NO PAIN
PAINLEVEL_OUTOF10: 3
PAINLEVEL_OUTOF10: 0 - NO PAIN

## 2024-01-23 ASSESSMENT — PAIN DESCRIPTION - LOCATION
LOCATION: PERINEUM
LOCATION: PERINEUM

## 2024-01-23 NOTE — LACTATION NOTE
Lactation Consultant Note  Lactation Consultation  Reason for Consult: Initial assessment, Other (Comment) (listed as BF but, giving formula via bottle)  Consultant Name: Ana Dhaliwal Rn, IBCLC    Maternal Information  Has mother  before?: No  Infant to breast within first 2 hours of birth?: Yes  Exclusive Pump and Bottle Feed:  (Mom does not know what she would like to do, but, at this time, she is open to the idea of pumping)    Maternal Assessment  Breast Assessment: Medium, Symmetrical, Compressible, Soft, Other (Comment) (exxpressible but, mom does n ot tolerate pressure of breast compression - sts. uncomfortable)  Nipple Assessment: Intact, Erect  [place] Assessment: Normal    Infant Assessment  Infant Behavior: Deep sleep    Feeding Assessment  Nutrition Source: Formula (per mother’s request)  Feeding Method: Paced bottle  Unable to assess infant feeding at this time: Maternal request (fed formula)    LATCH TOOL       Breast Pump  Pump: Hospital grade electric pump, Double breast pumping  Frequency:  (encouraged her to pump every 3 hours if not latching to the breast)  Duration: Initiate phase  Breast Shield Size and Type: 21 mm    Other OB Lactation Tools       Patient Follow-up  Inpatient Lactation Follow-up Needed : Yes  Outpatient Lactation Follow-up: Recommended    Other OB Lactation Documentation  Infant Risk Factors: Early term birth 37-39 weeks    Recommendations/Summary  I did not view a latch at this time.   Mom stated she does not know what she would like to do- she recently fed the baby formula via bottle and she fed the baby formula through the night. She did latch the baby after delivery and denied any pain with the latch, but, she is unsure if she would like to breast feed.   Reviewed how formula / bottle feeding can negatively impact breast feeding success, milk production, and long-term supply.   Discussed option of pumping and mom was receptive.     Oriented mom to pump  set up- use- and cleaning of pump parts.   Reviewed the difference between latching and pumping, the benefit of skin to skin, the benefits of breast massage prior to pumping, expectations of volume with pumping, milk storage and cleaning of pump parts.   PI-123 and [facility] pump cleaning guide given.     Reviewed feeding cues, the normal feeding patterns in the first 24 hours of life, the role of colostrum, output on different days of life, milk production/ supply in the first few days of life, and the benefits of skin to skin.      If she chooses to not latch the baby to the breast; encouraged her to pump every 3 hours (8-12 times in a 24 hour period) for 15 minutes on both breasts and to give the baby any pumped breast milk prior to any use of supplement.    If she would like to latch the baby to the breast; encouraged her to willie for assistance.     Mom stated she has a breast pump for at home.     Encouraged her to utilize the outpatient lactation resources, if needed.   Contact information given.   [phone] [place] or [phone] [place]

## 2024-01-23 NOTE — ANESTHESIA POSTPROCEDURE EVALUATION
Patient: Jaiden Skinner    Procedure Summary       Date: 24 Room / Location:     Anesthesia Start: 920 Anesthesia Stop:     Procedure: Labor Analgesia Diagnosis:     Scheduled Providers:  Responsible Provider: Vivienne Packer MD    Anesthesia Type: epidural ASA Status: 2            Anesthesia Type: epidural  Jaiden Skinner is a 18 y.o., , who had a Vaginal, Spontaneous  delivery on 2024  at 38w1d and is now POD1.    She had Neuraxial anesthesia without immediate complications noted.       Pain well controlled    Vitals:    24 0326   BP: 98/62   Pulse: 101   Resp: 18   Temp: 36.4 °C (97.5 °F)   SpO2: 98%       Neuraxial site assessed. No visible redness or swelling or drainage. Patient able to ambulate and move all extremities without difficulty. Able to void. No complaints of nausea/vomiting. Tolerating PO intake well. No s/sx of PDPH.     Anesthesia will sign off     YULY Spaulding-SUMIT      Anesthesia Post Evaluation    Patient location during evaluation: floor  Patient participation: complete - patient participated  Level of consciousness: awake and alert  Pain score: 0  Pain management: adequate  Airway patency: patent  Cardiovascular status: acceptable  Respiratory status: acceptable  Hydration status: acceptable  Postoperative Nausea and Vomiting: none        No notable events documented.

## 2024-01-23 NOTE — PROGRESS NOTES
Postpartum Progress Note    Assessment/Plan   Jaiden Skinner is a 18 y.o., , who delivered at 38w1d gestation and is now postpartum day 1.    Ghtn  Asx, no meds      Pregnancy n/f  - Anemia  admission Hb: 10.2 T&Cx2  - Teen pregnancy  - Asthma PRN Albuterol  - Rh negative, RhoGam at 28 w  - AbN 1 hr, N 3 hr    #Postpartum  - continue routine postpartum care  - pain well controlled on po medications  - dvt risk score   5 , for ppx lovenox      #Maternal Well-Being  - emotional support provided  - bonding with infant    # Feeding  - breastfeeding/pumping encouraged; lactation consult prn    #Dispo  - Anticipate DC PPD#3 pending BP control.  - The signs and symptoms of PEC were reviewed with the patient, including unrelenting headache, vision changes/blurred vision, and RUQ pain  - BP cuff for home for checking BP twice a day  - Pt instructed to call primary OB if SBP > 160 or DBP > 110 or if development of PEC symptoms   - On discharge, follow up with primary OB in:       - 2-5 days for BP check          Principal Problem:    Indication for care or intervention in labor or delivery  Active Problems:    Asthma    Anemia affecting pregnancy    High risk teen pregnancy, antepartum    Pregnancy Problems (from 23 to present)       Problem Noted Resolved    Indication for care or intervention in labor or delivery 2024 by Lea Tan MD No    Priority:  Medium      Back pain in pregnancy 2023 by Anna Man MD No    Priority:  Medium      Rh negative state in antepartum period 2023 by Anna Man MD No    Priority:  Medium      Overview Signed 2024  9:05 AM by Lea Tan MD     Rho Cleve on 2023         Fetal size inconsistent with dates 2023 by Anna Man MD No    Priority:  Medium      Anemia affecting pregnancy 10/13/2023 by Meka Palacios No    Priority:  Medium      Overview Addendum 2024  9:03 AM by Lea Tan MD     Last Hb: 8.9 1/3/2024  Pt was  scheduled for IV iron infusion  Type and crossed for 2 units of PRBCs         Asthma affecting pregnancy, antepartum 10/13/2023 by Meka Palacios No    Priority:  Medium      High risk teen pregnancy, antepartum 10/13/2023 by Meka Palacios No    Priority:  Medium      Overview Addendum 1/22/2024  9:07 AM by Lea Tan MD     Abnormal 1 hour, normal 3 hr GTT  Teen pregnancy  Anemia  GBS negative                 Hospital course: no complications  Vaginal Birth  Patient is currently breastfeedingThe patient's blood type is O NEG. The baby's blood type is B POS . Rhogam indicated and given.    Subjective   Her pain is well controlled with current medications  She is passing flatus  She is ambulating well  She is tolerating a Adult diet Regular  She reports no breast or nursing problems  She denies emotional concerns today   Her plan for contraception is none     Ambulating in room.  Discussed 3 day bp monitoring.  Pt. Would like discharge ppd2 based on bps.    Objective   Allergies:   Patient has no known allergies.         Last Vitals:  Temp Pulse Resp BP MAP Pulse Ox   36.4 °C (97.5 °F) (!) 113 (RN Sabine notified) 16 128/85   98 %     Vitals Min/Max Last 24 Hours:  Temp  Min: 36.4 °C (97.5 °F)  Max: 37.2 °C (99 °F)  Pulse  Min: 92  Max: 148  Resp  Min: 14  Max: 18  BP  Min: 98/62  Max: 141/76    Intake/Output:     Intake/Output Summary (Last 24 hours) at 1/23/2024 1221  Last data filed at 1/23/2024 0130  Gross per 24 hour   Intake 160 ml   Output 1290 ml   Net -1130 ml       Physical Exam:  General: Examination reveals a well developed, well nourished, female, in no acute distress. She is alert and cooperative.  Lungs: symmetrical, non-labored breathing.  Cardiac: warm, well-perfused.  Abdomen: soft, non-tender.  Fundus: firm, at umbilicus, and nontender.  Extremities: no redness or tenderness in the calves or thighs.  Neurological: alert, oriented, normal speech, no focal findings or movement disorder noted.     Lab  "Data:  Lab Results   Component Value Date    WBC 13.0 (H) 01/22/2024    HGB 10.2 (L) 01/22/2024    HCT 32.8 (L) 01/22/2024     01/22/2024     No results found for: \"GLUCOSE\", \"NA\", \"K\", \"CL\", \"CO2\", \"ANIONGAP\", \"BUN\", \"CREATININE\", \"EGFR\", \"CALCIUM\", \"ALBUMIN\", \"PROT\", \"ALKPHOS\", \"ALT\", \"AST\", \"BILITOT\"  "

## 2024-01-23 NOTE — CARE PLAN
The patient's goals for the shift include to have a healthy baby    The clinical goals for the shift include vaginal delivery    Pt had . Both mom and baby are stable and transferred to [facility] for recovery.

## 2024-01-23 NOTE — L&D DELIVERY NOTE
OB Delivery Note  2024  Jaiden Skinner  18 y.o.   Vaginal, Spontaneous        Gestational Age: 38w1d  /Para:   EBL from delivery: 300 mL  Quantitative Blood Loss: Admission to Discharge: 321 mL (2024  6:39 AM - 2024  1:14 AM)    Raymon Skinner [[id number]]      Labor Events    Sac identifier: Sac 1  Rupture date/time: 2024 1010  Rupture type: Artificial  Fluid color: Clear  Fluid odor: None  Labor type: Spontaneous Onset of Labor  Labor allowed to proceed with plans for an attempted vaginal birth?: Yes  Augmentation: AROM  Augmentation date/time: 2024 1010  Complications: None       Labor Event Times    Labor onset date/time: 2024  Dilation complete date/time: 2024  Start pushing date/time: 2024       Labor Length    1st stage: 10h 03m  2nd stage: 1h 27m  3rd stage: 0h 07m       Placenta    Placenta delivery date/time: 2024  Placenta removal: Spontaneous  Placenta appearance: Intact  Placenta disposition: pathology       Cord    Vessels: 3 vessels  Complications: None  Delayed cord clamping?: Yes  Cord blood disposition: Lab  Gases sent?: No  Stem cell collection (by provider): No       Lacerations    Episiotomy: None  Perineal laceration: None  Labial laceration?: Yes  Labial laceration location: bilateral  Labial laceration repaired?: Yes  Repair suture: 4-0 Synthetic Suture, 3-0 Synthetic Suture       Anesthesia    Method: Epidural       Operative Delivery    Forceps attempted?: No  Vacuum extractor attempted?: No       Shoulder Dystocia    Shoulder dystocia present?: No        Delivery    Time head delivered: 2024 21:40:00  Birth date/time: 2024 21:40:00  Delivery type: Vaginal, Spontaneous  Complications: None       Resuscitation    Method: None       Apgars    Living status: Living  Apgar Component Scores:  1 min.:  5 min.:  10 min.:  15 min.:  20 min.:    Skin color:  1  1       Heart rate:  2  2       Reflex  irritability:  2  2       Muscle tone:  2  2       Respiratory effort:  2  2       Total:  9  9       Apgars assigned by: WALT MONTERO MD       Delivery Providers    Delivering clinician: Rebeka Hernandes MD   Provider Role    Krissy Dejesus, RN Delivery Nurse    Clementine Montero, RN Nursery Nurse    Blaire Monsivais MD Resident                 Blaire Monsivais MD PGY1

## 2024-01-24 ENCOUNTER — PHARMACY VISIT (OUTPATIENT)
Dept: PHARMACY | Facility: CLINIC | Age: 19
End: 2024-01-24
Payer: MEDICAID

## 2024-01-24 VITALS
DIASTOLIC BLOOD PRESSURE: 83 MMHG | BODY MASS INDEX: 40.33 KG/M2 | WEIGHT: 213.63 LBS | HEIGHT: 61 IN | RESPIRATION RATE: 18 BRPM | HEART RATE: 95 BPM | SYSTOLIC BLOOD PRESSURE: 123 MMHG | TEMPERATURE: 97.2 F | OXYGEN SATURATION: 97 %

## 2024-01-24 PROCEDURE — 2500000004 HC RX 250 GENERAL PHARMACY W/ HCPCS (ALT 636 FOR OP/ED): Performed by: NURSE PRACTITIONER

## 2024-01-24 PROCEDURE — RXMED WILLOW AMBULATORY MEDICATION CHARGE

## 2024-01-24 PROCEDURE — 2500000001 HC RX 250 WO HCPCS SELF ADMINISTERED DRUGS (ALT 637 FOR MEDICARE OP)

## 2024-01-24 RX ORDER — MEDROXYPROGESTERONE ACETATE 150 MG/ML
150 INJECTION, SUSPENSION INTRAMUSCULAR ONCE AS NEEDED
Status: COMPLETED | OUTPATIENT
Start: 2024-01-24 | End: 2024-01-24

## 2024-01-24 RX ORDER — ACETAMINOPHEN 500 MG
1000 TABLET ORAL EVERY 6 HOURS PRN
Qty: 120 TABLET | Refills: 0 | Status: SHIPPED | OUTPATIENT
Start: 2024-01-24

## 2024-01-24 RX ORDER — DOCUSATE SODIUM 100 MG/1
100 CAPSULE, LIQUID FILLED ORAL 2 TIMES DAILY PRN
Qty: 60 CAPSULE | Refills: 0 | Status: SHIPPED | OUTPATIENT
Start: 2024-01-24 | End: 2024-02-23

## 2024-01-24 RX ORDER — IBUPROFEN 600 MG/1
600 TABLET ORAL EVERY 6 HOURS PRN
Qty: 120 TABLET | Refills: 0 | Status: SHIPPED | OUTPATIENT
Start: 2024-01-24 | End: 2024-02-23

## 2024-01-24 RX ADMIN — ACETAMINOPHEN 975 MG: 325 TABLET ORAL at 11:03

## 2024-01-24 RX ADMIN — IBUPROFEN 600 MG: 600 TABLET, FILM COATED ORAL at 11:03

## 2024-01-24 RX ADMIN — IBUPROFEN 600 MG: 600 TABLET, FILM COATED ORAL at 05:12

## 2024-01-24 RX ADMIN — ACETAMINOPHEN 975 MG: 325 TABLET ORAL at 05:12

## 2024-01-24 RX ADMIN — MEDROXYPROGESTERONE ACETATE 150 MG: 150 INJECTION, SUSPENSION INTRAMUSCULAR at 11:03

## 2024-01-24 ASSESSMENT — PAIN SCALES - GENERAL: PAINLEVEL_OUTOF10: 0 - NO PAIN

## 2024-01-24 NOTE — PROGRESS NOTES
Social Work Assessment     Patient: Jaiden Skinner, 19yo,     Referral Reason: history of depression, history of self-harm    Prenatal Care: Uh x 10  Barriers: none    [place] Name: Octavio HernandezJr juanchoAkil  [place] : 24    Other Children: none    FOB: Octavio Fragoso    Household Composition: Ms Skinner, FOB,     Supports: Ms Skinner reports her FOB is her primary support.     IPV/DV or Safety Concerns: denies    Car-Seat: yes  Safe Sleep Space: yes  Safe Sleep Education: reviewed    Transportation Concerns: denies    School/Work/Income: Ms Skinner report she receives food stamp benefits and is on WIC. She states FOB works and provides financial support as well. She denies resource needs    Insurance: Ms Skinner reports she has [employer] insurance through her father and also has Medicaid. SW reviewed need to add  to Medicaid case within 30 days, reviewed process.    Substance Use History: denies    Mental Health Diagnoses/Concerns: Ms Skinner with a history of depression with self harm in 9th grade (about 5 years ago), denies concerns since. SW reviewed postpartum depression signs, symptoms, and resources and Ms Skinner indicated understanding.    Bonding:Ms Skinner appears to be bonding and participating in care, no concerns noted.     Assessment: SW met with Ms Skinner for assessment due to history of depression. She was accepting and engaged. She has good support, needed items for , and denies signs and symptoms of depression throughout pregnancy (depression was in 9th grade). No other concerns noted.     Plan: Ms Skinner and  clear from SW perspective.     Signature: JENNA Kessler

## 2024-01-24 NOTE — DISCHARGE SUMMARY
Discharge Summary    Admission Date: 2024  Discharge Date: 24  Discharge Diagnosis:  (normal spontaneous vaginal delivery)     Patient Active Problem List   Diagnosis    Asthma    Anemia affecting pregnancy    Asthma affecting pregnancy, antepartum    High risk teen pregnancy, antepartum    History of pica    Yeast vaginitis    Back pain in pregnancy    Rh negative state in antepartum period    Fetal size inconsistent with dates     (normal spontaneous vaginal delivery)       Hospital Course  Jaiden Skinner is a 18 y.o.,     Initially presented for: IOL    Admission Date: 2024    Delivery Date: 2024  9:40 PM     Delivery type: Vaginal, Spontaneous      GA at delivery: 38w1d    Outcome: Living     Anesthesia during delivery: Epidural     Intrapartum complications: None     Feeding method: Breastfeeding Status: Yes    Contraception: Depo-Provera    Rhogam: The patient's blood type is O NEG. The baby's blood type is B POS . Rhogam indicated and given.     Now postpartum day: 2.    Hospital course n/f:    Mild range BP's were during epidural insertion and otherwise situational as documented. While pt technically meets criteria for gHTN, given the association of elevations with acute events and spontaneous resolution of elevations, low c/f dx gHTN at this time. I reviewed s/sx PEC w the pt.    Chronic Fe def anemia  - Admit Hg 10.2  - denies pica sx  - cont PO Fe on DC    Maternal Well-being  - needs fasting lipid panel; prev results likely elevated 2/2 physiologic changes of pregnancy; order placed for outpatient bloodwork; to f/up w PCP  - pt to schedule imaging for possible R carotid bruit (per PCP)    PP course otherwise uneventful.  Meeting all postpartum milestones- ambulating independently, passing flatus, tolerating PO intake, lochia light, voiding spontaneously, and pain well controlled with PO meds.     Per PCP, pt to schedule imaging for possible R carotid  bruit    Dispo  OK for DC today  6 week postpartum follow-up with prenatal provider.     Pertinent Physical Exam At Time of Discharge  General: well appearing, obese, postpartum  Obstetric: fundus firm below umbilicus, lochia light  Skin: Warm, dry; no rashes/lesions/erythema  Breast: No masses, nipple discharge  Neuro: A/Ox3, conversational, no gross motor deficit   GI: no distension, appropriately tender, soft, +BS  Respiratory: Even and unlabored on RA . LSCTA BL  Cardiovascular: ; Trace BLE edema; No erythema, warmth  BL carotid: no bruit on BL auscultation, no thrill palpated  Psych: appropriate mood and affect       Your medication list        START taking these medications        Instructions Last Dose Given Next Dose Due   acetaminophen 500 mg tablet  Commonly known as: Tylenol      Take 2 tablets (1,000 mg) by mouth every 6 hours if needed for moderate pain (4 - 6).       docusate sodium 100 mg capsule  Commonly known as: Colace      Take 1 capsule (100 mg) by mouth 2 times a day as needed for constipation.       ibuprofen 600 mg tablet      Take 1 tablet (600 mg) by mouth every 6 hours if needed for moderate pain (4 - 6) (pain).              CONTINUE taking these medications        Instructions Last Dose Given Next Dose Due   albuterol 90 mcg/actuation inhaler           prenatal vitamin (iron-folic) 27 mg iron-800 mcg folic acid tablet           Prenatal Vitamin Plus Low Iron 27 mg iron- 1 mg tablet  Generic drug: prenatal vitamin calcium-iron-folic      Take 1 tablet by mouth once daily.                 Where to Get Your Medications        These medications were sent to [facility]  [address] [place] [id number]      Hours: 8:30 AM to 5 PM Mon-Fri Phone: [phone]   acetaminophen 500 mg tablet  docusate sodium 100 mg capsule  ibuprofen 600 mg tablet           Outpatient Follow-Up  Future Appointments   Date Time Provider Department Center   1/26/2024 11:00 AM MACKENZIE [id number]  INFUSION NURSE [id number] [facility]   1/30/2024  2:45 PM Anna Man MD [id number] [facility]       Carlota Michelle, APRN-CNP

## 2024-01-24 NOTE — LACTATION NOTE
Lactation Consultant Note  Lactation Consultation  Reason for Consult: Follow-up assessment  Consultant Name: Ana Dhaliwal RN, iBCLC    Maternal Information  Exclusive Pump and Bottle Feed: Yes    Maternal Assessment       Infant Assessment       Feeding Assessment  Nutrition Source: Formula (per mother’s request)  Feeding Method: Paced bottle    LATCH TOOL       Breast Pump  Pump: Hospital grade electric pump, Double breast pumping  Frequency: Less than 3 times per day  Duration: Initiate phase    Other OB Lactation Tools       Patient Follow-up  Inpatient Lactation Follow-up Needed : No  Outpatient Lactation Follow-up: Recommended    Other OB Lactation Documentation       Recommendations/Summary  I did not view a latch at this time.   Yesterday, mom was unsure of what she wanted to do in regards to breast feeding. She was open to be set up to pump (d/t she was not latching), but, since yesterday she has decided that she does not feel comfortable with latching or pumping here in the hospital. She would prefer to pump with her pump in the privacy of her own home.   Reviewed with her Milk production/ supply and the s/s of engorgement, plugged ducts, and the risks of Mastitis. Encouraged her to start pumping h ere in the hospital every 3 hours (if she starts to feel uncomfortable; milk coming in, to avoid engorgement, plugged ducts, and Mastitis).    Mom verbalized understanding.     Encouraged her to call me for assistance or questions.

## 2024-01-25 LAB
LABORATORY COMMENT REPORT: NORMAL
PATH REPORT.FINAL DX SPEC: NORMAL
PATH REPORT.GROSS SPEC: NORMAL
PATH REPORT.RELEVANT HX SPEC: NORMAL
PATH REPORT.TOTAL CANCER: NORMAL

## 2024-01-26 LAB
BLOOD EXPIRATION DATE: NORMAL
BLOOD EXPIRATION DATE: NORMAL
DISPENSE STATUS: NORMAL
DISPENSE STATUS: NORMAL
PRODUCT BLOOD TYPE: 9500
PRODUCT BLOOD TYPE: 9500
PRODUCT CODE: NORMAL
PRODUCT CODE: NORMAL
UNIT ABO: NORMAL
UNIT ABO: NORMAL
UNIT NUMBER: NORMAL
UNIT NUMBER: NORMAL
UNIT RH: NORMAL
UNIT RH: NORMAL
UNIT VOLUME: 307
UNIT VOLUME: 350
XM INTEP: NORMAL
XM INTEP: NORMAL

## 2024-01-30 ENCOUNTER — APPOINTMENT (OUTPATIENT)
Dept: OBSTETRICS AND GYNECOLOGY | Facility: CLINIC | Age: 19
End: 2024-01-30
Payer: COMMERCIAL

## 2024-02-09 ENCOUNTER — TELEPHONE (OUTPATIENT)
Dept: OBSTETRICS AND GYNECOLOGY | Facility: CLINIC | Age: 19
End: 2024-02-09
Payer: COMMERCIAL

## 2024-02-09 NOTE — TELEPHONE ENCOUNTER
Called pt to F/U on [employer] message related to boils/cyst. Unable to leave a message, voice mailbox not set up. [employer] message sent requesting pt call to schedule to be seen prior to PP apt on 3/4

## 2024-03-04 ENCOUNTER — APPOINTMENT (OUTPATIENT)
Dept: OBSTETRICS AND GYNECOLOGY | Facility: CLINIC | Age: 19
End: 2024-03-04
Payer: COMMERCIAL

## 2024-03-20 ENCOUNTER — POSTPARTUM VISIT (OUTPATIENT)
Dept: OBSTETRICS AND GYNECOLOGY | Facility: CLINIC | Age: 19
End: 2024-03-20
Payer: COMMERCIAL

## 2024-03-20 DIAGNOSIS — R10.2 PELVIC PAIN: ICD-10-CM

## 2024-03-20 DIAGNOSIS — O92.70 LACTATION PROBLEM (HHS-HCC): ICD-10-CM

## 2024-03-20 DIAGNOSIS — Z30.9 ENCOUNTER FOR CONTRACEPTIVE MANAGEMENT, UNSPECIFIED TYPE: ICD-10-CM

## 2024-03-20 DIAGNOSIS — O99.013 ANEMIA AFFECTING PREGNANCY IN THIRD TRIMESTER (HHS-HCC): ICD-10-CM

## 2024-03-20 PROCEDURE — 0503F POSTPARTUM CARE VISIT: CPT | Performed by: OBSTETRICS & GYNECOLOGY

## 2024-03-20 RX ORDER — MEDROXYPROGESTERONE ACETATE 150 MG/ML
150 INJECTION, SUSPENSION INTRAMUSCULAR
Qty: 1 ML | Refills: 3 | Status: SHIPPED | OUTPATIENT
Start: 2024-04-19

## 2024-03-20 RX ORDER — PRENATAL WITH FERROUS FUM AND FOLIC ACID 3080; 920; 120; 400; 22; 1.84; 3; 20; 10; 1; 12; 200; 27; 25; 2 [IU]/1; [IU]/1; MG/1; [IU]/1; MG/1; MG/1; MG/1; MG/1; MG/1; MG/1; UG/1; MG/1; MG/1; MG/1; MG/1
1 TABLET ORAL DAILY
Qty: 90 TABLET | Refills: 1 | Status: SHIPPED | OUTPATIENT
Start: 2024-03-20 | End: 2024-09-16

## 2024-03-20 ASSESSMENT — EDINBURGH POSTNATAL DEPRESSION SCALE (EPDS)
I HAVE BEEN ANXIOUS OR WORRIED FOR NO GOOD REASON: HARDLY EVER
I HAVE FELT SCARED OR PANICKY FOR NO GOOD REASON: NO, NOT AT ALL
I HAVE BEEN SO UNHAPPY THAT I HAVE BEEN CRYING: NO, NEVER
I HAVE LOOKED FORWARD WITH ENJOYMENT TO THINGS: AS MUCH AS I EVER DID
THINGS HAVE BEEN GETTING ON TOP OF ME: NO, MOST OF THE TIME I HAVE COPED QUITE WELL
I HAVE BLAMED MYSELF UNNECESSARILY WHEN THINGS WENT WRONG: NOT VERY OFTEN
I HAVE BEEN SO UNHAPPY THAT I HAVE HAD DIFFICULTY SLEEPING: NOT AT ALL
I HAVE FELT SAD OR MISERABLE: NO, NOT AT ALL
TOTAL SCORE: 3
I HAVE BEEN ABLE TO LAUGH AND SEE THE FUNNY SIDE OF THINGS: AS MUCH AS I ALWAYS COULD
THE THOUGHT OF HARMING MYSELF HAS OCCURRED TO ME: NEVER

## 2024-03-20 NOTE — PATIENT INSTRUCTIONS
Congratulations on the birth of your baby boy!      Review the postpartum information pack provided today.  Return to the office every 12 weeks for your Depo-Provera injection.  A prescription has been sent to your pharmacy.    Follow-up with one of our lactation specialist.  A refill of your prenatal vitamin has been sent to the pharmacy.  Continue take 1 tablet daily.    Arrange to have an ultrasound performed to help evaluate your left-sided pelvic pain.  Will discuss the results when you come in for your routine annual exam.    Consider follow-up with one of our genetics counselors to discuss family history of cancer.    Return the office in 3 to 4 months for your annual GYN exam.      Feel free to call the office with any problems, questions or concerns prior to your next scheduled visit.      Follow-up with your PCP and other healthcare specialist as needed.

## 2024-03-20 NOTE — PROGRESS NOTES
18-year-old -0-0-1 -American teen presents approximately 8 weeks status post  in a pregnancy complicated by asthma and anemia.     A/P: PPV     -  PP info packet     Contraception    -  Depo Provera 150 mg IM Q 12 weeks, rx sent     -  RTC for APE and Depo in 16 weeks     Lactation concerns     -  Lactation referral     -  Info about ways to increase supply     -  Refill PNV     LLQ pain, concern about mother's ovarian CA diagnosis    -  US     -  Genetics referral

## 2024-03-21 VITALS
HEIGHT: 61 IN | SYSTOLIC BLOOD PRESSURE: 118 MMHG | BODY MASS INDEX: 34.55 KG/M2 | DIASTOLIC BLOOD PRESSURE: 84 MMHG | WEIGHT: 183 LBS

## 2024-03-22 ENCOUNTER — HOSPITAL ENCOUNTER (OUTPATIENT)
Dept: RADIOLOGY | Facility: CLINIC | Age: 19
Discharge: HOME | End: 2024-03-22
Payer: MEDICAID

## 2024-03-22 DIAGNOSIS — R10.2 PELVIC PAIN: ICD-10-CM

## 2024-03-22 PROCEDURE — 76830 TRANSVAGINAL US NON-OB: CPT | Performed by: RADIOLOGY

## 2024-03-22 PROCEDURE — 76856 US EXAM PELVIC COMPLETE: CPT

## 2024-03-22 PROCEDURE — 76856 US EXAM PELVIC COMPLETE: CPT | Performed by: RADIOLOGY

## 2024-03-26 ENCOUNTER — TELEPHONE (OUTPATIENT)
Dept: OBSTETRICS AND GYNECOLOGY | Facility: CLINIC | Age: 19
End: 2024-03-26
Payer: COMMERCIAL

## 2024-03-26 NOTE — TELEPHONE ENCOUNTER
Called patient to discuss results  Informed patient of essentially normal results and to follow up with PFPT.   No questions or concerns at this time.    KAREN Cooper RN        ----- Message from Anna Man MD sent at 3/25/2024  2:10 PM EDT -----  Essentially normal, although limited. Follow up with PFPT.

## 2024-04-04 ENCOUNTER — PATIENT MESSAGE (OUTPATIENT)
Dept: PRIMARY CARE | Facility: CLINIC | Age: 19
End: 2024-04-04
Payer: COMMERCIAL

## 2024-04-11 ENCOUNTER — E-VISIT (OUTPATIENT)
Dept: PRIMARY CARE | Facility: CLINIC | Age: 19
End: 2024-04-11
Payer: COMMERCIAL

## 2024-04-11 ENCOUNTER — TELEPHONE (OUTPATIENT)
Dept: OBSTETRICS AND GYNECOLOGY | Facility: CLINIC | Age: 19
End: 2024-04-11
Payer: COMMERCIAL

## 2024-04-11 NOTE — TELEPHONE ENCOUNTER
Contacted pt and verified name and .  Spoke with staff at L&D who confirmed pt has all she needs to get required documents.  Pt notified that she would have to call [employer] in her local city to inquire about getting his birth certificate and she would have to contact social security to find out how to get the baby's social security card. Pt received documents at hospital discharge that could verify his birth and identity.  Pt states understanding and denies having questions at this time.

## 2024-04-11 NOTE — TELEPHONE ENCOUNTER
----- Message from Mendy Skinner sent at 4/11/2024 10:12 AM EDT -----  Regarding: Proof of birth  Contact: [phone]  No i haven’t can you please give me their number?

## 2024-04-12 ENCOUNTER — TELEPHONE (OUTPATIENT)
Dept: PRIMARY CARE | Facility: CLINIC | Age: 19
End: 2024-04-12
Payer: COMMERCIAL

## 2024-04-12 NOTE — TELEPHONE ENCOUNTER
----- Message from Mendy Skinner sent at 4/12/2024  7:47 AM EDT -----  Regarding: physical form  Contact: [phone]  Thank you so much!

## 2024-04-17 ENCOUNTER — APPOINTMENT (OUTPATIENT)
Dept: OBSTETRICS AND GYNECOLOGY | Facility: CLINIC | Age: 19
End: 2024-04-17
Payer: COMMERCIAL

## 2024-04-18 ENCOUNTER — CLINICAL SUPPORT (OUTPATIENT)
Dept: OBSTETRICS AND GYNECOLOGY | Facility: CLINIC | Age: 19
End: 2024-04-18
Payer: COMMERCIAL

## 2024-04-18 VITALS — SYSTOLIC BLOOD PRESSURE: 140 MMHG | WEIGHT: 184 LBS | DIASTOLIC BLOOD PRESSURE: 82 MMHG | BODY MASS INDEX: 34.77 KG/M2

## 2024-04-18 DIAGNOSIS — Z30.9 ENCOUNTER FOR CONTRACEPTIVE MANAGEMENT, UNSPECIFIED TYPE: ICD-10-CM

## 2024-04-18 DIAGNOSIS — Z30.42 DEPO-PROVERA CONTRACEPTIVE STATUS: Primary | ICD-10-CM

## 2024-04-18 PROCEDURE — 96372 THER/PROPH/DIAG INJ SC/IM: CPT

## 2024-04-18 RX ORDER — MEDROXYPROGESTERONE ACETATE 150 MG/ML
150 INJECTION, SUSPENSION INTRAMUSCULAR ONCE
Status: COMPLETED | OUTPATIENT
Start: 2024-04-18 | End: 2024-04-18

## 2024-04-18 RX ADMIN — MEDROXYPROGESTERONE ACETATE 150 MG: 150 INJECTION, SUSPENSION INTRAMUSCULAR at 11:17

## 2024-04-18 ASSESSMENT — PAIN SCALES - GENERAL: PAINLEVEL: 0-NO PAIN

## 2024-04-18 NOTE — PROGRESS NOTES
Patient here for Depo injection. Last injection given 01/24/2024  Medication supplied by office.  Injection given as documented. Patient tolerated well. Education provided.  UPT: Deferred  Last Annual Exam: 06/2023  LMP: Unknown  Patient to RTC between for depo and or/annual exam 07/04/2024-07/18/2024  Patient verbalized understanding and all questions answered.    RN discussed Depo-Provera side effects, including:  Irregular menstrual periods, or no periods at all  Headaches  Nervousness  Depression  Acne  Changes in appetite  Weight gain  Excessive growth of facial and body hair  Hair loss  Decreased bone density    Pt to follow up with PCP about headaches. Bp taken today in office 140/82  Weight 184 lbs  5,1

## 2024-05-10 ENCOUNTER — HOSPITAL ENCOUNTER (EMERGENCY)
Facility: HOSPITAL | Age: 19
Discharge: HOME | End: 2024-05-10
Payer: COMMERCIAL

## 2024-05-10 ENCOUNTER — APPOINTMENT (OUTPATIENT)
Dept: RADIOLOGY | Facility: HOSPITAL | Age: 19
End: 2024-05-10
Payer: COMMERCIAL

## 2024-05-10 ENCOUNTER — APPOINTMENT (OUTPATIENT)
Dept: CARDIOLOGY | Facility: HOSPITAL | Age: 19
End: 2024-05-10
Payer: COMMERCIAL

## 2024-05-10 VITALS
RESPIRATION RATE: 18 BRPM | SYSTOLIC BLOOD PRESSURE: 131 MMHG | DIASTOLIC BLOOD PRESSURE: 89 MMHG | WEIGHT: 183 LBS | BODY MASS INDEX: 34.55 KG/M2 | TEMPERATURE: 97.9 F | OXYGEN SATURATION: 98 % | HEIGHT: 61 IN | HEART RATE: 103 BPM

## 2024-05-10 DIAGNOSIS — R06.2 WHEEZE: ICD-10-CM

## 2024-05-10 DIAGNOSIS — R05.1 ACUTE COUGH: Primary | ICD-10-CM

## 2024-05-10 LAB
ALBUMIN SERPL-MCNC: 4.6 G/DL (ref 3.5–5)
ALP BLD-CCNC: 105 U/L (ref 35–125)
ALT SERPL-CCNC: 11 U/L (ref 5–40)
ANION GAP SERPL CALC-SCNC: 12 MMOL/L
AST SERPL-CCNC: 15 U/L (ref 5–40)
ATRIAL RATE: 89 BPM
BASOPHILS # BLD AUTO: 0.05 X10*3/UL (ref 0–0.1)
BASOPHILS NFR BLD AUTO: 0.6 %
BILIRUB SERPL-MCNC: 0.5 MG/DL (ref 0.1–1.2)
BUN SERPL-MCNC: 14 MG/DL (ref 8–25)
CALCIUM SERPL-MCNC: 9.8 MG/DL (ref 8.5–10.4)
CHLORIDE SERPL-SCNC: 104 MMOL/L (ref 97–107)
CO2 SERPL-SCNC: 21 MMOL/L (ref 24–31)
CREAT SERPL-MCNC: 0.8 MG/DL (ref 0.4–1.6)
D DIMER PPP FEU-MCNC: 0.3 MG/L FEU (ref 0.19–0.5)
EGFRCR SERPLBLD CKD-EPI 2021: >90 ML/MIN/1.73M*2
EOSINOPHIL # BLD AUTO: 1.05 X10*3/UL (ref 0–0.7)
EOSINOPHIL NFR BLD AUTO: 13.6 %
ERYTHROCYTE [DISTWIDTH] IN BLOOD BY AUTOMATED COUNT: 15.5 % (ref 11.5–14.5)
GLUCOSE SERPL-MCNC: 97 MG/DL (ref 65–99)
HCT VFR BLD AUTO: 40.5 % (ref 36–46)
HGB BLD-MCNC: 12.9 G/DL (ref 12–16)
IMM GRANULOCYTES # BLD AUTO: 0.01 X10*3/UL (ref 0–0.7)
IMM GRANULOCYTES NFR BLD AUTO: 0.1 % (ref 0–0.9)
LYMPHOCYTES # BLD AUTO: 3.01 X10*3/UL (ref 1.2–4.8)
LYMPHOCYTES NFR BLD AUTO: 38.9 %
MCH RBC QN AUTO: 27.1 PG (ref 26–34)
MCHC RBC AUTO-ENTMCNC: 31.9 G/DL (ref 32–36)
MCV RBC AUTO: 85 FL (ref 80–100)
MONOCYTES # BLD AUTO: 0.31 X10*3/UL (ref 0.1–1)
MONOCYTES NFR BLD AUTO: 4 %
NEUTROPHILS # BLD AUTO: 3.31 X10*3/UL (ref 1.2–7.7)
NEUTROPHILS NFR BLD AUTO: 42.8 %
NRBC BLD-RTO: 0 /100 WBCS (ref 0–0)
P AXIS: 38 DEGREES
P OFFSET: 198 MS
P ONSET: 153 MS
PLATELET # BLD AUTO: 262 X10*3/UL (ref 150–450)
POTASSIUM SERPL-SCNC: 4.1 MMOL/L (ref 3.4–5.1)
PR INTERVAL: 122 MS
PROT SERPL-MCNC: 8.1 G/DL (ref 5.9–7.9)
Q ONSET: 214 MS
QRS COUNT: 15 BEATS
QRS DURATION: 76 MS
QT INTERVAL: 358 MS
QTC CALCULATION(BAZETT): 435 MS
QTC FREDERICIA: 408 MS
R AXIS: -15 DEGREES
RBC # BLD AUTO: 4.76 X10*6/UL (ref 4–5.2)
SARS-COV-2 RNA RESP QL NAA+PROBE: NOT DETECTED
SODIUM SERPL-SCNC: 137 MMOL/L (ref 133–145)
T AXIS: 7 DEGREES
T OFFSET: 393 MS
TROPONIN T SERPL-MCNC: <6 NG/L
VENTRICULAR RATE: 89 BPM
WBC # BLD AUTO: 7.7 X10*3/UL (ref 4.4–11.3)

## 2024-05-10 PROCEDURE — 93005 ELECTROCARDIOGRAM TRACING: CPT

## 2024-05-10 PROCEDURE — 94640 AIRWAY INHALATION TREATMENT: CPT

## 2024-05-10 PROCEDURE — 87635 SARS-COV-2 COVID-19 AMP PRB: CPT | Performed by: PHYSICIAN ASSISTANT

## 2024-05-10 PROCEDURE — 84484 ASSAY OF TROPONIN QUANT: CPT | Performed by: PHYSICIAN ASSISTANT

## 2024-05-10 PROCEDURE — 85025 COMPLETE CBC W/AUTO DIFF WBC: CPT | Performed by: PHYSICIAN ASSISTANT

## 2024-05-10 PROCEDURE — 71046 X-RAY EXAM CHEST 2 VIEWS: CPT

## 2024-05-10 PROCEDURE — 71046 X-RAY EXAM CHEST 2 VIEWS: CPT | Performed by: RADIOLOGY

## 2024-05-10 PROCEDURE — 85300 ANTITHROMBIN III ACTIVITY: CPT | Performed by: PHYSICIAN ASSISTANT

## 2024-05-10 PROCEDURE — 80053 COMPREHEN METABOLIC PANEL: CPT | Performed by: PHYSICIAN ASSISTANT

## 2024-05-10 PROCEDURE — 99283 EMERGENCY DEPT VISIT LOW MDM: CPT | Mod: 25

## 2024-05-10 PROCEDURE — 2500000002 HC RX 250 W HCPCS SELF ADMINISTERED DRUGS (ALT 637 FOR MEDICARE OP, ALT 636 FOR OP/ED): Performed by: PHYSICIAN ASSISTANT

## 2024-05-10 PROCEDURE — 36415 COLL VENOUS BLD VENIPUNCTURE: CPT | Performed by: PHYSICIAN ASSISTANT

## 2024-05-10 RX ORDER — ALBUTEROL SULFATE 90 UG/1
2 AEROSOL, METERED RESPIRATORY (INHALATION) EVERY 4 HOURS PRN
Qty: 18 G | Refills: 0 | Status: SHIPPED | OUTPATIENT
Start: 2024-05-10 | End: 2024-06-09

## 2024-05-10 RX ORDER — IPRATROPIUM BROMIDE AND ALBUTEROL SULFATE 2.5; .5 MG/3ML; MG/3ML
3 SOLUTION RESPIRATORY (INHALATION) ONCE
Status: COMPLETED | OUTPATIENT
Start: 2024-05-10 | End: 2024-05-10

## 2024-05-10 RX ORDER — METHYLPREDNISOLONE 4 MG/1
TABLET ORAL
Qty: 21 TABLET | Refills: 0 | Status: SHIPPED | OUTPATIENT
Start: 2024-05-10 | End: 2024-05-17

## 2024-05-10 RX ADMIN — IPRATROPIUM BROMIDE AND ALBUTEROL SULFATE 3 ML: .5; 3 SOLUTION RESPIRATORY (INHALATION) at 11:50

## 2024-05-10 ASSESSMENT — COLUMBIA-SUICIDE SEVERITY RATING SCALE - C-SSRS
2. HAVE YOU ACTUALLY HAD ANY THOUGHTS OF KILLING YOURSELF?: NO
1. IN THE PAST MONTH, HAVE YOU WISHED YOU WERE DEAD OR WISHED YOU COULD GO TO SLEEP AND NOT WAKE UP?: NO
6. HAVE YOU EVER DONE ANYTHING, STARTED TO DO ANYTHING, OR PREPARED TO DO ANYTHING TO END YOUR LIFE?: NO

## 2024-05-10 ASSESSMENT — PAIN SCALES - GENERAL: PAINLEVEL_OUTOF10: 0 - NO PAIN

## 2024-05-10 ASSESSMENT — PAIN - FUNCTIONAL ASSESSMENT: PAIN_FUNCTIONAL_ASSESSMENT: 0-10

## 2024-05-10 NOTE — ED PROVIDER NOTES
HPI   Chief Complaint   Patient presents with    Flu Symptoms     Pt to ED with SOB that feels like asthma with tightness in the chest since yesterday. Pt with headaches for the past 3 weeks. Pt with dry cough for the past week. No ear/sinus/throat, nausea, vomiting, diarrhea, urinary symptoms, fevers. No swelling.        18-year-old female presented emergency department the chief complaint of chest discomfort, shortness of breath.  Feels a tightness in her chest.  Has some pain with inspiration.  She is 3 months postpartum.  No history of DVT or pulmonary embolism.  Uncomplicated pregnancy course.  Denies lightheadedness dizziness numbness weakness.  Well-appearing nontoxic.  Her child is also here for evaluation for cough.  No other complaint.                          Shania Coma Scale Score: 15                     Patient History   Past Medical History:   Diagnosis Date    Anemia     Asthma affecting pregnancy, antepartum ([facility])     High risk teen pregnancy, antepartum ([facility])     History of pica     Nausea and vomiting in pregnancy ([facility])     Rh negative status during pregnancy ([facility])     Rhogam at 28 weeks     No past surgical history on file.  Family History   Problem Relation Name Age of Onset    Ovarian cancer Mother          drugs    Hypertension Father          drugs and alcoholic    Sickle cell trait Sister 4     Other (1 of 4 sisters has sickle cell trait) Sister 4     Diabetes Paternal Grandmother       Social History     Tobacco Use    Smoking status: Never     Passive exposure: Never    Smokeless tobacco: Never   Vaping Use    Vaping status: Never Used   Substance Use Topics    Alcohol use: Never    Drug use: Never       Physical Exam   ED Triage Vitals [05/10/24 1128]   Temperature Heart Rate Respirations BP   36.6 °C (97.9 °F) (!) 103 18 131/89      Pulse Ox Temp Source Heart Rate Source Patient Position   98 % Temporal -- Sitting      BP Location FiO2 (%)     Left arm --       Physical  Exam  Vitals and nursing note reviewed.   Constitutional:       Appearance: Normal appearance.   HENT:      Head: Normocephalic.      Mouth/Throat:      Mouth: Mucous membranes are moist.   Cardiovascular:      Rate and Rhythm: Normal rate and regular rhythm.   Pulmonary:      Comments: Slight wheeze  Abdominal:      General: Abdomen is flat.   Musculoskeletal:         General: Normal range of motion.      Cervical back: Normal range of motion.   Skin:     General: Skin is warm.   Neurological:      General: No focal deficit present.      Mental Status: She is alert and oriented to person, place, and time.   Psychiatric:         Mood and Affect: Mood normal.         ED Course & MDM   ED Course as of 05/12/24 2201   Fri May 10, 2024   1524 EKG with normal sinus rhythm at 89 bpm, normal axis, normal voltage, normal ST segment, and a slight diffuse flattening of T waves [NG]      ED Course User Index  [NG] Rosalina Stevenson MD         Diagnoses as of 05/12/24 2201   Acute cough   Wheeze       Medical Decision Making  I have seen and evaluated this patient.  Physician available for consultation.  Vital signs have been reviewed.  All laboratory and diagnostic imaging is reviewed by myself and interpreted by myself unless otherwise stated.  Additionally imaging is interpreted by radiologist.    CBC without significant leukocytosis or anemia, metabolic panel without significant renal impairment or electrolyte abnormality.  CT angiogram negative.  Patient improved after breathing treatment.  Placed on short course of steroid with primary referral.  Released in good condition.    Labs Reviewed  CBC WITH AUTO DIFFERENTIAL - Abnormal     WBC                           7.7                    nRBC                          0.0                    RBC                           4.76                   Hemoglobin                    12.9                   Hematocrit                    40.5                   MCV                           85                      MCH                           27.1                   MCHC                          31.9 (*)               RDW                           15.5 (*)               Platelets                     262                    Neutrophils %                 42.8                   Immature Granulocytes %, Automated   0.1                    Lymphocytes %                 38.9                   Monocytes %                   4.0                    Eosinophils %                 13.6                   Basophils %                   0.6                    Neutrophils Absolute          3.31                   Immature Granulocytes Absolute, Au*   0.01                   Lymphocytes Absolute          3.01                   Monocytes Absolute            0.31                   Eosinophils Absolute          1.05 (*)               Basophils Absolute            0.05                COMPREHENSIVE METABOLIC PANEL - Abnormal     Glucose                       97                     Sodium                        137                    Potassium                     4.1                    Chloride                      104                    Bicarbonate                   21 (*)                 Urea Nitrogen                 14                     Creatinine                    0.80                   eGFR                          >90                    Calcium                       9.8                    Albumin                       4.6                    Alkaline Phosphatase          105                    Total Protein                 8.1 (*)                AST                           15                     Bilirubin, Total              0.5                    ALT                           11                     Anion Gap                     12                  D-DIMER, NON VTE - Normal     D-Dimer Non VTE, Quant (mg/L FEU)   0.30                     Narrative: THROMBOEMBOLIC EVENTS CANNOT BE EXCLUDED SOLELY ON THE BASIS OF THE D-DIMER  LEVEL BEING WITHIN THE NORMAL REFERENCE RANGE. D-DIMER LEVELS LESS THAN 0.5 MG/L FEU IN CONJUNCTION WITH A LOW CLINICAL PROBABILITY HAVE AN EXCELLENT NEGATIVE PREDICTIVE VALUE IN EXCLUDING A DIAGNOSIS OF PULMONARY EMBOLUS (PE) OR DEEP VEIN THROMBOSIS (DVT). ELEVATED D-DIMER LEVELS ARE NOT SPECIFIC TO PE OR DVT, AND MAY BE SEEN IN PATIENTS WITH DIC, ADVANCED AGE, PREGNANCY, MALIGNANCY, LIVER DISEASE, INFECTION, AND INFLAMMATORY CONDITIONS AMONG OTHERS. D-DIMER LEVELS MAY BE DECREASED IN PATIENTS RECEIVING ANTI-COAGULATION THERAPY.  SARS-COV-2 PCR - Normal     Coronavirus 2019, PCR                                  Narrative: This assay has received FDA Emergency Use Authorization (EUA) and is only authorized for the duration of time that circumstances exist to justify the authorization of the emergency use of in vitro diagnostic tests for the detection of SARS-CoV-2 virus and/or diagnosis of COVID-19 infection under section 564(b)(1) of the Act, 21 U.S.C. 360bbb-3(b)(1). This assay is an in vitro diagnostic nucleic acid amplification test for the qualitative detection of SARS-CoV-2 from nasopharyngeal specimens and has been validated for use at [facility]. Negative results do not preclude COVID-19 infections and should not be used as the sole basis for diagnosis, treatment, or other management decisions.                  SERIAL TROPONIN, INITIAL ([facility]) - Normal     Troponin T, High Sensitivity   <6                  TROPONIN T SERIES, HIGH SENSITIVITY (0, 2 HR, 6 HR)       Narrative: The following orders were created for panel order Troponin T Series, High Sensitivity (0, 2HR, 6HR).                Procedure                               Abnormality         Status                                   ---------                               -----------         ------                                   Serial Troponin, Initial...[[id number]]  Normal              Final result                              Serial Troponin, 2 Hour ...[[id number]]                                                                               Please view results for these tests on the individual orders.  SERIAL TROPONIN,  2 HOUR ([facility])  XR chest 2 views   Final Result    1. No acute cardiopulmonary process.          MACRO:    None.          Signed by: Stella Hummel 5/10/2024 12:28 PM    Dictation workstation:   [id number]     Medications  ipratropium-albuteroL (Duo-Neb) 0.5-2.5 mg/3 mL nebulizer solution 3 mL (3 mL nebulization Given 5/10/24 1150)  Discharge Medication List as of 5/10/2024  1:12 PM    START taking these medications    !! albuterol 90 mcg/actuation inhaler  Inhale 2 puffs every 4 hours if needed for wheezing., Starting Fri 5/10/2024, Until Sun 6/9/2024 at 2359, Normal    methylPREDNISolone (Medrol Dospak) 4 mg tablets  Follow schedule on package instructions, Normal    !! - Potential duplicate medications found. Please discuss with provider.                  Procedure  Procedures     Herve Chou PA-C  05/12/24 3304

## 2024-05-31 ENCOUNTER — HOSPITAL ENCOUNTER (EMERGENCY)
Facility: HOSPITAL | Age: 19
Discharge: HOME | End: 2024-05-31
Payer: MEDICAID

## 2024-05-31 VITALS
TEMPERATURE: 97.9 F | WEIGHT: 186 LBS | BODY MASS INDEX: 35.12 KG/M2 | OXYGEN SATURATION: 100 % | HEIGHT: 61 IN | SYSTOLIC BLOOD PRESSURE: 138 MMHG | HEART RATE: 86 BPM | DIASTOLIC BLOOD PRESSURE: 97 MMHG | RESPIRATION RATE: 18 BRPM

## 2024-05-31 DIAGNOSIS — J32.9 SINUSITIS, UNSPECIFIED CHRONICITY, UNSPECIFIED LOCATION: Primary | ICD-10-CM

## 2024-05-31 PROCEDURE — 2500000001 HC RX 250 WO HCPCS SELF ADMINISTERED DRUGS (ALT 637 FOR MEDICARE OP): Performed by: NURSE PRACTITIONER

## 2024-05-31 PROCEDURE — 99283 EMERGENCY DEPT VISIT LOW MDM: CPT

## 2024-05-31 PROCEDURE — 96372 THER/PROPH/DIAG INJ SC/IM: CPT | Performed by: NURSE PRACTITIONER

## 2024-05-31 PROCEDURE — 2500000004 HC RX 250 GENERAL PHARMACY W/ HCPCS (ALT 636 FOR OP/ED): Performed by: NURSE PRACTITIONER

## 2024-05-31 RX ORDER — AMOXICILLIN AND CLAVULANATE POTASSIUM 875; 125 MG/1; MG/1
1 TABLET, FILM COATED ORAL ONCE
Status: COMPLETED | OUTPATIENT
Start: 2024-05-31 | End: 2024-05-31

## 2024-05-31 RX ORDER — AMOXICILLIN AND CLAVULANATE POTASSIUM 875; 125 MG/1; MG/1
875 TABLET, FILM COATED ORAL EVERY 12 HOURS
Qty: 20 TABLET | Refills: 0 | Status: SHIPPED | OUTPATIENT
Start: 2024-05-31 | End: 2024-06-10

## 2024-05-31 RX ORDER — FLUTICASONE PROPIONATE 50 MCG
1 SPRAY, SUSPENSION (ML) NASAL DAILY
Qty: 16 G | Refills: 0 | Status: SHIPPED | OUTPATIENT
Start: 2024-05-31 | End: 2024-06-30

## 2024-05-31 RX ORDER — LORATADINE 10 MG/1
10 TABLET ORAL DAILY
Qty: 15 TABLET | Refills: 0 | Status: SHIPPED | OUTPATIENT
Start: 2024-05-31 | End: 2024-05-31

## 2024-05-31 RX ORDER — LORATADINE 10 MG/1
10 TABLET ORAL DAILY
Qty: 15 TABLET | Refills: 0 | Status: SHIPPED | OUTPATIENT
Start: 2024-05-31 | End: 2024-06-15

## 2024-05-31 RX ORDER — AMOXICILLIN AND CLAVULANATE POTASSIUM 875; 125 MG/1; MG/1
875 TABLET, FILM COATED ORAL EVERY 12 HOURS
Qty: 20 TABLET | Refills: 0 | Status: SHIPPED | OUTPATIENT
Start: 2024-05-31 | End: 2024-05-31

## 2024-05-31 RX ORDER — KETOROLAC TROMETHAMINE 30 MG/ML
30 INJECTION, SOLUTION INTRAMUSCULAR; INTRAVENOUS ONCE
Status: COMPLETED | OUTPATIENT
Start: 2024-05-31 | End: 2024-05-31

## 2024-05-31 RX ORDER — FLUTICASONE PROPIONATE 50 MCG
1 SPRAY, SUSPENSION (ML) NASAL DAILY
Qty: 16 G | Refills: 0 | Status: SHIPPED | OUTPATIENT
Start: 2024-05-31 | End: 2024-05-31

## 2024-05-31 RX ADMIN — AMOXICILLIN AND CLAVULANATE POTASSIUM 1 TABLET: 875; 125 TABLET, FILM COATED ORAL at 14:28

## 2024-05-31 RX ADMIN — KETOROLAC TROMETHAMINE 30 MG: 30 INJECTION, SOLUTION INTRAMUSCULAR at 14:28

## 2024-05-31 ASSESSMENT — COLUMBIA-SUICIDE SEVERITY RATING SCALE - C-SSRS
1. IN THE PAST MONTH, HAVE YOU WISHED YOU WERE DEAD OR WISHED YOU COULD GO TO SLEEP AND NOT WAKE UP?: NO
2. HAVE YOU ACTUALLY HAD ANY THOUGHTS OF KILLING YOURSELF?: NO
6. HAVE YOU EVER DONE ANYTHING, STARTED TO DO ANYTHING, OR PREPARED TO DO ANYTHING TO END YOUR LIFE?: NO

## 2024-05-31 NOTE — ED PROVIDER NOTES
HPI   Chief Complaint   Patient presents with    Headache       HPI  See my MDM                  Shania Coma Scale Score: 15                     Patient History   Past Medical History:   Diagnosis Date    Anemia     Asthma affecting pregnancy, antepartum ([facility])     High risk teen pregnancy, antepartum ([facility])     History of pica     Nausea and vomiting in pregnancy ([facility])     Rh negative status during pregnancy ([facility])     Rhogam at 28 weeks     No past surgical history on file.  Family History   Problem Relation Name Age of Onset    Ovarian cancer Mother          drugs    Hypertension Father          drugs and alcoholic    Sickle cell trait Sister 4     Other (1 of 4 sisters has sickle cell trait) Sister 4     Diabetes Paternal Grandmother       Social History     Tobacco Use    Smoking status: Never     Passive exposure: Never    Smokeless tobacco: Never   Vaping Use    Vaping status: Never Used   Substance Use Topics    Alcohol use: Never    Drug use: Never       Physical Exam   ED Triage Vitals [05/31/24 1416]   Temperature Heart Rate Respirations BP   36.6 °C (97.9 °F) 86 18 (!) 138/97      Pulse Ox Temp Source Heart Rate Source Patient Position   100 % Temporal Monitor Sitting      BP Location FiO2 (%)     Right arm --       Physical Exam  CONSTITUTIONAL: Vital signs reviewed as charted, well-developed and in no distress  Eyes: Extraocular muscles are intact. Pupils equal round and reactive to light. Conjunctiva are pink.    ENT: Mucous membranes are moist. Tongue in the midline. Pharynx was without erythema or exudates, uvula midline.  Red inflamed nasal turbinates, bilateral tympanic membranes are normal  LUNGS: Breath sounds equal and clear to auscultation. Good air exchange, no wheezes rales or retractions, pulse oximetry is charted.  HEART: Regular rate and rhythm without murmur thrill or rub, strong tones, auscultation is normal.  ABDOMEN: Soft and nontender without guarding rebound rigidity  or mass. Bowel sounds are present and normal in all quadrants. There is no palpable masses or aneurysms identified. No hepatosplenomegaly, normal abdominal exam.  Neuro: The patient is awake, alert and oriented ×3. Moving all 4 extremities and answering questions appropriately.   MUSCULOSKELETAL: The calves are nontender to palpation. Full gross active range of motion.   PSYCH: Awake alert oriented, normal mood and affect.  Skin:  Dry, normal color, warm to the touch, no rash present.      ED Course & MDM   Diagnoses as of 05/31/24 1436   Sinusitis, unspecified chronicity, unspecified location       Medical Decision Making  History obtained from: patient    Vital signs, nursing notes, current medications, past medical history, Surgical history, allergies, social history, family History were reviewed.         HPI:  Facial pain pressure and headache ongoing for about 4 weeks now.  Was here couple of weeks ago given steroids states it really did not help.  States she has had on and off fevers.  No nausea vomiting diarrhea.  States she called her PCP today to schedule follow-up and they can get her in for a few days.  She denies chest pain, shortness of breath, abdominal pain extremity edema.  Patient is also having watering of her right eye.      10 point ROS was reviewed and negative except Noted above in HPI.  DDX: as listed above          MDM Summary/considerations:    Labs Reviewed - No data to display  No orders to display     Medications   amoxicillin-pot clavulanate (Augmentin) 875-125 mg per tablet 1 tablet (1 tablet oral Given 5/31/24 1428)   ketorolac (Toradol) injection 30 mg (30 mg intramuscular Given 5/31/24 1428)     Current Discharge Medication List        START taking these medications    Details   amoxicillin-pot clavulanate (Augmentin) 875-125 mg tablet Take 1 tablet (875 mg) by mouth every 12 hours for 10 days.  Qty: 20 tablet, Refills: 0    Associated Diagnoses: Sinusitis, unspecified chronicity,  unspecified location      fluticasone (Flonase) 50 mcg/actuation nasal spray Administer 1 spray into each nostril once daily. Shake gently. Before first use, prime pump. After use, clean tip and replace cap.  Qty: 16 g, Refills: 0    Associated Diagnoses: Sinusitis, unspecified chronicity, unspecified location      loratadine (Claritin) 10 mg tablet Take 1 tablet (10 mg) by mouth once daily for 15 days.  Qty: 15 tablet, Refills: 0    Associated Diagnoses: Sinusitis, unspecified chronicity, unspecified location             I estimate there is LOW risk for EPIGLOTTITIS, PNEUMONIA, MENINGITIS, OR URINARY TRACT INFECTION, thus I consider the discharge disposition reasonable. Also, there is no evidence for peritonitis, sepsis, or toxicity. We have discussed the diagnosis and risks, and we agree with discharging home to follow-up with their primary doctor. We also discussed returning to the Emergency Department immediately if new or worsening symptoms occur. We have discussed the symptoms which are most concerning (e.g., changing or worsening pain, trouble swallowing or breathing, neck stiffness, fever) that necessitate immediate return.    Examination consistent with sinusitis, will start on antibiotics given how long it has been ongoing.  Recommended starting on Claritin and Flonase.  She will follow with her PCP 1 to 2 days for reevaluation was discharged home stable condition.    All of the patient's questions were answered to the best of my ability.  Patient states understanding that they have been screened for an emergency today and we have not found any etiology of symptoms that requires emergent treatment or admission to the hospital at this point. They understand that they have not had definitive care day and require follow-up for treatment of their condition. They also state understanding that they may have an emergent condition that may potentially have not of detected at this visit and they must return to the  emergency department if they develop any worsening of symptoms or new complaints.      I have evaluated this patient, my supervising physician was available for consultation.            Critical Care: Not warranted at this time        This chart was completed using voice recognition transcription software. Please excuse any errors of transcription including grammatical, punctuation, syntax and spelling errors.  Please contact me with any questions regarding this chart.  Patient 18-year-old female present emergency room today complaining of    Procedure  Procedures     YULY Morales-VELASQUEZ  05/31/24 1697

## 2024-05-31 NOTE — ED TRIAGE NOTES
Headache/Abd Pain/Diarrhea/Fever at home. Pt state that her HA happened for a long time. Dx with Sinus infection

## 2024-07-10 ENCOUNTER — APPOINTMENT (OUTPATIENT)
Dept: OBSTETRICS AND GYNECOLOGY | Facility: CLINIC | Age: 19
End: 2024-07-10
Payer: MEDICAID

## 2024-07-10 VITALS
HEIGHT: 62 IN | SYSTOLIC BLOOD PRESSURE: 116 MMHG | DIASTOLIC BLOOD PRESSURE: 74 MMHG | BODY MASS INDEX: 34.71 KG/M2 | WEIGHT: 188.6 LBS

## 2024-07-10 DIAGNOSIS — Z30.9 ENCOUNTER FOR CONTRACEPTIVE MANAGEMENT, UNSPECIFIED TYPE: ICD-10-CM

## 2024-07-10 DIAGNOSIS — Z01.419 ENCOUNTER FOR ANNUAL ROUTINE GYNECOLOGICAL EXAMINATION: Primary | ICD-10-CM

## 2024-07-10 DIAGNOSIS — G44.89 OTHER HEADACHE SYNDROME: ICD-10-CM

## 2024-07-10 DIAGNOSIS — B37.31 YEAST VAGINITIS: ICD-10-CM

## 2024-07-10 DIAGNOSIS — L73.2 HIDRADENITIS: ICD-10-CM

## 2024-07-10 PROCEDURE — 99395 PREV VISIT EST AGE 18-39: CPT | Performed by: OBSTETRICS & GYNECOLOGY

## 2024-07-10 PROCEDURE — 87591 N.GONORRHOEAE DNA AMP PROB: CPT

## 2024-07-10 PROCEDURE — 3008F BODY MASS INDEX DOCD: CPT | Performed by: OBSTETRICS & GYNECOLOGY

## 2024-07-10 PROCEDURE — 96372 THER/PROPH/DIAG INJ SC/IM: CPT | Performed by: OBSTETRICS & GYNECOLOGY

## 2024-07-10 PROCEDURE — 87491 CHLMYD TRACH DNA AMP PROBE: CPT

## 2024-07-10 RX ORDER — FLUCONAZOLE 150 MG/1
150 TABLET ORAL ONCE
Qty: 1 TABLET | Refills: 0 | Status: SHIPPED | OUTPATIENT
Start: 2024-07-10 | End: 2024-07-10

## 2024-07-10 RX ORDER — MEDROXYPROGESTERONE ACETATE 150 MG/ML
150 INJECTION, SUSPENSION INTRAMUSCULAR
Status: SHIPPED | OUTPATIENT
Start: 2024-07-10

## 2024-07-10 ASSESSMENT — PAIN SCALES - GENERAL: PAINLEVEL: 0-NO PAIN

## 2024-07-10 NOTE — PATIENT INSTRUCTIONS
Thanks for coming in today for your annual GYN exam.      Your first Pap smear is due at age 20.  However, please return to the office once a year for your annual GYN exam.      Routine cultures are sent today.  Results should be available in the next 24 to 48 hours.  You may call the office and select option #2 to speak with the nurse to obtain the results.      Follow-up with dermatology about your recurrent boils.      Follow-up with neurology about your headaches.      A prescription for Diflucan-1 pill to treat yeast has been sent to your pharmacy.      Return to the office every 12 weeks for your next Depo-Provera injection.      Follow-up with your PCP and other healthcare specialist as needed.

## 2024-07-10 NOTE — PROGRESS NOTES
"18-year-old -0-0-1 -American teen presents today for annual GYN exam.  She is using Depo-Provera for contraception.    She reports intermittent headaches.  She was seen in the ED and recently completed an antibiotic for a sinus infection.  Nonetheless we will put a referral in for neurology.    The patient also reports hidradenitis symptoms.    She denies any pelvic pain or abnormal discharge.  She reports some irregular bleeding with the Depo-Provera, but \"had similar bleeding last time that eventually went away \".    A/P: APE     -  Pap at 20     -  GC and CT     -  Neuro for HA     -  Derm referral     -  Diflucan prn after recent abx   "

## 2024-07-10 NOTE — PROGRESS NOTES
Last Depo:4/18/2024  Next Due:9/25/2024-10/9/2024  CIHNA:7/10/2024  UPT:declined  LMP:none  Complaints:none  Site Given: JACK batista  NDC:80191-512-66  LOT:[id number]  EXP:9/2025

## 2024-07-11 LAB
C TRACH RRNA SPEC QL NAA+PROBE: NEGATIVE
N GONORRHOEA DNA SPEC QL PROBE+SIG AMP: NEGATIVE

## 2024-07-12 ENCOUNTER — TELEPHONE (OUTPATIENT)
Dept: OBSTETRICS AND GYNECOLOGY | Facility: CLINIC | Age: 19
End: 2024-07-12
Payer: COMMERCIAL

## 2024-08-01 NOTE — PROGRESS NOTES
Subjective   Patient ID:   Mendy Skinner is a 19 y.o. female who presents for No chief complaint on file..  HPI  New patient here today to establish care with myself.  Last PCP:  Last seen:    Asthma:  Using rescue inhaler as needed.    Health maintenance:  Smoking:  Labs: Nov 2023.  Influenza:    Review of Systems  12 point review of systems negative unless stated above in HPI    There were no vitals filed for this visit.    Physical Exam  General: Alert and oriented, well nourished, no acute distress.  Lungs: Clear to auscultation, non-labored respiration.  Heart: Normal rate, regular rhythm, no murmur, gallop or edema.  Neurologic: Awake, alert, and oriented X3, CN II-XII intact.  Psychiatric: Cooperative, appropriate mood and affect.    Assessment/Plan   There are no diagnoses linked to this encounter.

## 2024-08-05 ENCOUNTER — APPOINTMENT (OUTPATIENT)
Dept: PRIMARY CARE | Facility: CLINIC | Age: 19
End: 2024-08-05
Payer: COMMERCIAL

## 2024-08-05 ENCOUNTER — HOSPITAL ENCOUNTER (EMERGENCY)
Facility: HOSPITAL | Age: 19
Discharge: STILL ADMITTED OR ADMINISTRATIVE | End: 2024-08-06
Payer: COMMERCIAL

## 2024-08-05 DIAGNOSIS — J45.20 MILD INTERMITTENT ASTHMA WITHOUT COMPLICATION (HHS-HCC): Primary | ICD-10-CM

## 2024-10-02 ENCOUNTER — OFFICE VISIT (OUTPATIENT)
Dept: NEUROLOGY | Facility: HOSPITAL | Age: 19
End: 2024-10-02
Payer: COMMERCIAL

## 2024-10-02 ENCOUNTER — APPOINTMENT (OUTPATIENT)
Dept: OBSTETRICS AND GYNECOLOGY | Facility: CLINIC | Age: 19
End: 2024-10-02
Payer: COMMERCIAL

## 2024-10-02 VITALS
HEIGHT: 61 IN | SYSTOLIC BLOOD PRESSURE: 133 MMHG | TEMPERATURE: 97.3 F | BODY MASS INDEX: 35.5 KG/M2 | RESPIRATION RATE: 18 BRPM | DIASTOLIC BLOOD PRESSURE: 92 MMHG | HEART RATE: 72 BPM | WEIGHT: 188 LBS

## 2024-10-02 DIAGNOSIS — G43.009 MIGRAINE WITHOUT AURA AND WITHOUT STATUS MIGRAINOSUS, NOT INTRACTABLE: Primary | ICD-10-CM

## 2024-10-02 PROCEDURE — 1036F TOBACCO NON-USER: CPT | Performed by: STUDENT IN AN ORGANIZED HEALTH CARE EDUCATION/TRAINING PROGRAM

## 2024-10-02 PROCEDURE — 99204 OFFICE O/P NEW MOD 45 MIN: CPT | Performed by: STUDENT IN AN ORGANIZED HEALTH CARE EDUCATION/TRAINING PROGRAM

## 2024-10-02 PROCEDURE — 3008F BODY MASS INDEX DOCD: CPT | Performed by: STUDENT IN AN ORGANIZED HEALTH CARE EDUCATION/TRAINING PROGRAM

## 2024-10-02 PROCEDURE — 99214 OFFICE O/P EST MOD 30 MIN: CPT | Mod: GC | Performed by: STUDENT IN AN ORGANIZED HEALTH CARE EDUCATION/TRAINING PROGRAM

## 2024-10-02 RX ORDER — RIZATRIPTAN BENZOATE 10 MG/1
10 TABLET ORAL ONCE AS NEEDED
Qty: 9 TABLET | Refills: 0 | Status: SHIPPED | OUTPATIENT
Start: 2024-10-02 | End: 2024-10-02

## 2024-10-02 RX ORDER — RIZATRIPTAN BENZOATE 10 MG/1
10 TABLET ORAL ONCE AS NEEDED
Qty: 9 TABLET | Refills: 3 | Status: SHIPPED | OUTPATIENT
Start: 2024-10-02 | End: 2024-12-31

## 2024-10-02 ASSESSMENT — PAIN SCALES - GENERAL: PAINLEVEL: 0-NO PAIN

## 2024-10-02 NOTE — PATIENT INSTRUCTIONS
Dear Mendy,    You were here due to headaches. From your story, you meet a criteria for migraine with aura. Your neurological exam is unremarkable.    You are prescribed Rizatriptan 10 mg tablet. Please take it when headache starts rising.  Take 1 tablet (10 mg) by mouth 1 time if needed for migraine (may repeat x1). May repeat in 2 hours if unresolved. Do not exceed 30 mg in 24 hours.     Please keep recording headache journal.    It was a pleasure taking care of you!     Sincerely,      Department of Neurology

## 2024-10-02 NOTE — PROGRESS NOTES
Date of Service: 10/2/2024  Patient: Mendy Skinner  MRN: [id number]  Referring Provider: Anna Man MD  Primary Care Physician: No Assigned PCP Generic Sofia, MD     History of Present Illness:    Ms. Skinner is a 19 y.o. female without past significant history, who presents for evaluation of headache.    Patient complains of headaches for 2 years.   Description of pain: throbbing pain.   Duration of individual headaches: 2-3 days, frequency  3 times a month . It was gradually built up, not thunderclap like. PS 10/10. She has aura with yellow dots or flashing lights, unclear duration. She has to lay down in the dark and quiet room work.  Associated symptoms: aura, flashing lights, light sensitivity, nausea, and phonophobia .   Pain relief: unable to obtain relief with OTC meds.   Precipitating factors: patient is aware of none. She denies a history of recent head injury.   Prior neurological history: negative for no neurological problems.    It started before her recent pregnancy, which delivered in January 2024. Her headache was better controlled during pregnancy and breast feeding.    Neurologic Review of Systems - no TIA or stroke-like symptoms, no amaurosis, diplopia, abnormal speech, unilateral numbness or weakness.    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  No smoking.  No alcohol.  No drug.    Review of Systems:  The systems were reviewed with pertinent positives and negatives documented in the HPI.     Problem List Items Addressed This Visit    None  Visit Diagnoses       Other headache syndrome              Past Medical History:   Diagnosis Date    Anemia     Asthma affecting pregnancy, antepartum ([facility])     High risk teen pregnancy, antepartum ([facility])     History of pica     Nausea and vomiting in pregnancy     Rh negative status during pregnancy ([facility])     Rhogam at 28 weeks     No past surgical history on file.  Family History   Problem Relation Name Age of Onset    Ovarian cancer  "Mother          drugs    Hypertension Father          drugs and alcoholic    Sickle cell trait Sister 4     Other (1 of 4 sisters has sickle cell trait) Sister 4     Diabetes Paternal Grandmother       Social History     Tobacco Use    Smoking status: Never     Passive exposure: Never    Smokeless tobacco: Never   Substance Use Topics    Alcohol use: Never      No Known Allergies     Medications:    Current Outpatient Medications:     acetaminophen (Tylenol) 500 mg tablet, Take 2 tablets (1,000 mg) by mouth every 6 hours if needed for moderate pain (4 - 6)., Disp: 120 tablet, Rfl: 0    albuterol 90 mcg/actuation inhaler, Inhale 2 puffs every 4 hours if needed for other., Disp: , Rfl:     medroxyPROGESTERone (Depo-Provera) 150 mg/mL injection, Inject 1 mL (150 mg) into the muscle every 12 weeks. Do not start before April 19, 2024., Disp: 1 mL, Rfl: 3    prenatal vitamin, iron-folic, 27 mg iron-800 mcg folic acid tablet, Take 1 tablet by mouth once daily., Disp: , Rfl:     albuterol 90 mcg/actuation inhaler, Inhale 2 puffs every 4 hours if needed for wheezing., Disp: 18 g, Rfl: 0    fluticasone (Flonase) 50 mcg/actuation nasal spray, Administer 1 spray into each nostril once daily. Shake gently. Before first use, prime pump. After use, clean tip and replace cap., Disp: 16 g, Rfl: 0    loratadine (Claritin) 10 mg tablet, Take 1 tablet (10 mg) by mouth once daily for 15 days., Disp: 15 tablet, Rfl: 0    Current Facility-Administered Medications:     medroxyPROGESTERone (Depo-Provera) injection 150 mg, 150 mg, intramuscular, q12 weeks, Anna Man MD, 150 mg at 07/10/24 1159     Physical Exam:     General Physical Exam:  BP (!) 133/92   Pulse 72   Temp 36.3 °C (97.3 °F)   Resp 18   Ht 1.549 m (5' 1\")   Wt 85.3 kg (188 lb)   BMI 35.52 kg/m²      She is not in any acute distress.     Neurological Exam:   Mental status reveals: alert and oriented to person, place, and date. Speech is intact to conversation. Fund of " "knowledge is normal.     Cranial nerves:  CN 2   Visual fields full to confrontation.   Funduscopic exam reveals sharp edge of optic disc bilaterally.  CN 3, 4, 6   Pupils round, 4 mm in diameter, equally reactive to light. Lids symmetric; no ptosis. EOMs normal alignment, full range.   No nystagmus.   CN 5   Facial sensation intact bilaterally.   CN 7   Normal and symmetric facial strength. Nasolabial folds symmetric.   CN 8   Hearing intact to conversation.   CN 9   Palate elevates symmetrically.   CN 11   Normal strength of shoulder shrug and neck turning.   CN 12   Tongue midline, with normal bulk and strength; no fasciculations.      Motor:  Muscle bulk and tone are normal. There are no tremor or other abnormal movement.    MANUAL MUSCLE TESTING IS AS FOLLOWS:    R L      5 5 Shoulder abduction   5 5 Elbow flexion   5 5 Elbow extension   5 5 Wrist flexion   5 5 Wrist extension   5 5 Finger flexion   5 5 Finger extension   5 5 Finger abduction   5 5 Thumb distal flexion   5 5 Thumb abduction     5 5 Hip flexion   5 5 Knee flexion   5 5 Knee extension   5 5 Ankle dorsiflexion    Reflexes:   R L    2 2 Biceps    2 2 Brachioradialis    2 2 Triceps    2 2 Patellar   2 2 Achilles      Sensory:   Touch sensation are normal and symmetrical.      Gait:  Station is stable with a normal base. Gait is stable with a normal arm swing and speed. There is no ataxia, shuffling, steppage or waddling gait.    Results:     The following labs, imaging, and results were personally reviewed and demonstrated:    Labs:  No results found for: \"HGBA1C\"    Lab Results   Component Value Date    HQDWGGWQ56 379 09/26/2023     IRON STUDIES:   Lab Results   Component Value Date    FERRITIN 19 09/26/2023     CBC:   Lab Results   Component Value Date    WBC 7.7 05/10/2024    HGB 12.9 05/10/2024    HCT 40.5 05/10/2024     05/10/2024     BMP:   Lab Results   Component Value Date     05/10/2024    K 4.1 05/10/2024     05/10/2024 "    CO2 21 (L) 05/10/2024    BUN 14 05/10/2024    CREATININE 0.80 05/10/2024    CALCIUM 9.8 05/10/2024     LFT:   Lab Results   Component Value Date    ALKPHOS 105 05/10/2024    BILITOT 0.5 05/10/2024    PROT 8.1 (H) 05/10/2024    ALBUMIN 4.6 05/10/2024    ALT 11 05/10/2024    AST 15 05/10/2024       Impression/Plan:     Ms. Skinner is a 19 y.o. female without past significant history, who presents for evaluation of headache.    Patient has had intermittent severe unilateral headache for 2 years, started a year before her recent pregnancy, associated with nausea, photophobia and phonophobia. It was better during pregnancy and breast feeding, now came back and does not respond to OTC. She denies other neurological deficits and history of head injury. She fulfills acute migraines criteria with 9 headache days per month and will benefit from abortive therapy, will order rizatriptan. Side effects such as nausea and chest tightness was informed. Patient was recommended to keep tract of her headache.    Plan:  -Patient was instructed to keep headache journal.  -Rizatriptan 10 mg PRN.  -Follow up 3 months.    Patient was discussed and examined with Dr. Velasquez.    Dario Mendez MD ([place])  Neurology Resident, PGY4

## 2024-10-28 ENCOUNTER — TELEPHONE (OUTPATIENT)
Dept: OBSTETRICS AND GYNECOLOGY | Facility: CLINIC | Age: 19
End: 2024-10-28
Payer: COMMERCIAL

## 2024-10-30 ENCOUNTER — TELEPHONE (OUTPATIENT)
Dept: OBSTETRICS AND GYNECOLOGY | Facility: CLINIC | Age: 19
End: 2024-10-30
Payer: COMMERCIAL

## 2024-11-26 ENCOUNTER — TELEPHONE (OUTPATIENT)
Dept: INPATIENT UNIT | Facility: HOSPITAL | Age: 19
End: 2024-11-26

## 2025-01-22 ENCOUNTER — APPOINTMENT (OUTPATIENT)
Dept: OBSTETRICS AND GYNECOLOGY | Facility: CLINIC | Age: 20
End: 2025-01-22
Payer: COMMERCIAL

## 2025-03-04 ENCOUNTER — APPOINTMENT (OUTPATIENT)
Dept: NEUROLOGY | Facility: CLINIC | Age: 20
End: 2025-03-04
Payer: COMMERCIAL